# Patient Record
Sex: FEMALE | Race: BLACK OR AFRICAN AMERICAN | Employment: FULL TIME | URBAN - METROPOLITAN AREA
[De-identification: names, ages, dates, MRNs, and addresses within clinical notes are randomized per-mention and may not be internally consistent; named-entity substitution may affect disease eponyms.]

---

## 2017-01-05 ENCOUNTER — HOSPITAL ENCOUNTER (OUTPATIENT)
Dept: PERINATAL CARE | Age: 27
Discharge: HOME | End: 2017-01-05
Attending: OBSTETRICS & GYNECOLOGY
Payer: MEDICAID

## 2017-01-05 PROCEDURE — 76805 OB US >/= 14 WKS SNGL FETUS: CPT | Performed by: OBSTETRICS & GYNECOLOGY

## 2017-01-19 ENCOUNTER — ROUTINE PRENATAL (OUTPATIENT)
Dept: MIDWIFE SERVICES | Age: 27
End: 2017-01-19

## 2017-01-19 VITALS
HEART RATE: 97 BPM | SYSTOLIC BLOOD PRESSURE: 120 MMHG | DIASTOLIC BLOOD PRESSURE: 72 MMHG | WEIGHT: 194 LBS | HEIGHT: 61 IN | BODY MASS INDEX: 36.63 KG/M2 | TEMPERATURE: 98.2 F

## 2017-01-19 DIAGNOSIS — Z34.82 ENCOUNTER FOR SUPERVISION OF OTHER NORMAL PREGNANCY, SECOND TRIMESTER: Primary | ICD-10-CM

## 2017-01-19 DIAGNOSIS — J45.909 UNCOMPLICATED ASTHMA, UNSPECIFIED ASTHMA SEVERITY: ICD-10-CM

## 2017-01-19 DIAGNOSIS — E55.9 VITAMIN D DEFICIENCY: ICD-10-CM

## 2017-01-19 NOTE — PROGRESS NOTES
Chief Complaint   Patient presents with    Routine Prenatal Visit     22w1d     Visit Vitals    /72    Pulse 97    Temp 98.2 °F (36.8 °C) (Oral)    Ht 5' 1\" (1.549 m)    Wt 194 lb (88 kg)    BMI 36.66 kg/m2     1. Have you been to the ER, urgent care clinic since your last visit? Hospitalized since your last visit? No    2. Have you seen or consulted any other health care providers outside of the [address] [place] [address] since your last visit? Include any pap smears or colon screening.  No

## 2017-01-19 NOTE — PROGRESS NOTES
S: Feeling well. No significant complaints or concerns. Consistent, daily fetal mvmt. No ctxs, LOF, or vaginal bldng. Does reports some increased groin pain, worse when walking and standing for long periods at work. Also reports bilateral corporal tunnel, worse in morning and somewhat better by afternoon. O: See prenatal flowsheet for maternal and fetal exam  Blood pressure 120/72, pulse 97, temperature 98.2 °F (36.8 °C), temperature source Oral, height 5' 1\" (1.549 m), weight 194 lb (88 kg). A:  22w1d   Size=Dates    P:   Discussed pelvic pain and recommended belly support band for relief. Recommended wrist braces at night to help with caporal tunnel, discussed normalcy during pregnancy. Reviewed common pregnancy changes and discomfort as well as comfort measures. See prenatal checklist for other topics covered during visit today  RTO in 4 weeks for SUDHAKAR with CNM with GDM screen.

## 2017-02-20 ENCOUNTER — ROUTINE PRENATAL (OUTPATIENT)
Dept: MIDWIFE SERVICES | Age: 27
End: 2017-02-20

## 2017-02-20 VITALS
BODY MASS INDEX: 37.1 KG/M2 | TEMPERATURE: 98.2 F | WEIGHT: 196.5 LBS | HEART RATE: 90 BPM | DIASTOLIC BLOOD PRESSURE: 63 MMHG | HEIGHT: 61 IN | SYSTOLIC BLOOD PRESSURE: 112 MMHG

## 2017-02-20 DIAGNOSIS — Z67.91 RH NEGATIVE STATE IN ANTEPARTUM PERIOD, SECOND TRIMESTER: ICD-10-CM

## 2017-02-20 DIAGNOSIS — Z34.82 ENCOUNTER FOR SUPERVISION OF OTHER NORMAL PREGNANCY, SECOND TRIMESTER: ICD-10-CM

## 2017-02-20 DIAGNOSIS — Z34.82 ENCOUNTER FOR SUPERVISION OF OTHER NORMAL PREGNANCY, SECOND TRIMESTER: Primary | ICD-10-CM

## 2017-02-20 DIAGNOSIS — O26.892 RH NEGATIVE STATE IN ANTEPARTUM PERIOD, SECOND TRIMESTER: ICD-10-CM

## 2017-02-20 DIAGNOSIS — O36.5920 SMALL FOR GESTATIONAL AGE FETUS AFFECTING MOTHER, ANTEPARTUM, SECOND TRIMESTER, NOT APPLICABLE OR UNSPECIFIED FETUS: ICD-10-CM

## 2017-02-20 NOTE — PROGRESS NOTES
Chief Complaint   Patient presents with    Routine Prenatal Visit     26w5d     Visit Vitals    /63    Pulse 90    Temp 98.2 °F (36.8 °C) (Oral)    Ht 5' 1\" (1.549 m)    Wt 196 lb 8 oz (89.1 kg)    BMI 37.13 kg/m2     1. Have you been to the ER, urgent care clinic since your last visit? Hospitalized since your last visit? No    2. Have you seen or consulted any other health care providers outside of the [address] [place] [address] since your last visit? Include any pap smears or colon screening.  No

## 2017-02-20 NOTE — PROGRESS NOTES
S: Feeling well. No significant complaints or concerns except bilateral hip pain. Reports consistent, daily fetal mvmt. No ctxs, LOF, or vaginal bldng. Has not had carbohydrate  Intake this morning. Has moved in in [facility] now, may need to leave practice due to distance. Mom still in area. O: See prenatal flowsheet for maternal and fetal exam  Blood pressure 112/63, pulse 90, temperature 98.2 °F (36.8 °C), temperature source Oral, height 5' 1\" (1.549 m), weight 196 lb 8 oz (89.1 kg), last menstrual period 2016. Anatomy scan was 9 days different from LMP/ early dating scan and f/u ultrasound recommended per .   Drank glucola in office at 1027, sent down to [facility] at 1100 to have blood drawn for 1 hr GCT, ABS and CBC at 1127. A:  26w5d   Size=Dates    P: Nurse visit in 2 weeks for Rhogam and 4 weeks for CNM visit. See prenatal checklist for other topics covered during today's visit  28 week labs and f/u ultrasound ordered per  recommendation.   RTO in 4 weeks for SUDHAKAR with CNM

## 2017-02-20 NOTE — MR AVS SNAPSHOT
Visit Information Date & Time Provider Department Dept. Phone Encounter #  
 2/20/2017 10:00 AM Emely AroraShukri [id number] Upcoming Health Maintenance Date Due INFLUENZA AGE 9 TO ADULT 8/1/2016 PAP AKA CERVICAL CYTOLOGY 9/19/2019 Allergies as of 2/20/2017  Review Complete On: 2/20/2017 By: Emely Arora CNM No Known Allergies Current Immunizations  Reviewed on 11/10/2016 No immunizations on file. Not reviewed this visit You Were Diagnosed With   
  
 Codes Comments Encounter for supervision of other normal pregnancy, second trimester    -  Primary ICD-10-CM: Z34.82 
ICD-9-CM: V22.1 Rh negative state in antepartum period, second trimester     ICD-10-CM: O09.892 ICD-9-CM: V23.89 Vitals BP Pulse Temp Height(growth percentile) Weight(growth percentile) LMP  
 112/63 90 98.2 °F (36.8 °C) (Oral) 5' 1\" (1.549 m) 196 lb 8 oz (89.1 kg) 08/17/2016 (Exact Date) BMI OB Status Smoking Status 37.13 kg/m2 Pregnant Former Smoker BMI and BSA Data Body Mass Index Body Surface Area  
 37.13 kg/m 2 1.96 m 2 Preferred Pharmacy Pharmacy Name Phone [id number], [address] [facility] [phone] Your Updated Medication List  
  
   
This list is accurate as of: 2/20/17 10:44 AM.  Always use your most recent med list.  
  
  
  
  
 Cholecalciferol (Vitamin D3) 2,000 unit Cap capsule Commonly known as:  VITAMIN D3 Take  by mouth two (2) times a day. PNV no.24-iron-folic acid-dha -452 mg-mcg-mg Cmpk Take 1 Tab by mouth daily. We Performed the Following TYPE & SCREEN [ZMM1306 Custom] Comments:  
 ENTER SURGERY DATE IF FOR PRE-OP TESTING. To-Do List   
 02/20/2017 Lab:  CBC WITH AUTOMATED DIFF   
  
 02/20/2017 Lab:  GLUCOSE, GESTATIONAL, 1 HR TOLERANCE Introducing [facility]! Romayne Duster introduces [employer] patient portal. Now you can access parts of your medical record, email your doctor's office, and request medication refills online. 1. In your internet browser, go to [email or web address] 2. Click on the First Time User? Click Here link in the Sign In box. You will see the New Member Sign Up page. 3. Enter your [employer] Access Code exactly as it appears below. You will not need to use this code after youve completed the sign-up process. If you do not sign up before the expiration date, you must request a new code. · [employer] Access Code: [email or web address] Expires: 5/21/2017  9:57 AM 
 
4. Enter the last four digits of your Social Security Number (xxxx) and Date of Birth (mm/dd/yyyy) as indicated and click Submit. You will be taken to the next sign-up page. 5. Create a [employer] ID. This will be your [employer] login ID and cannot be changed, so think of one that is secure and easy to remember. 6. Create a [employer] password. You can change your password at any time. 7. Enter your Password Reset Question and Answer. This can be used at a later time if you forget your password. 8. Enter your e-mail address. You will receive e-mail notification when new information is available in [address] 9. Click Sign Up. You can now view and download portions of your medical record. 10. Click the Download Summary menu link to download a portable copy of your medical information. If you have questions, please visit the Frequently Asked Questions section of the [employer] website. Remember, [employer] is NOT to be used for urgent needs. For medical emergencies, dial 911. Now available from your iPhone and Android! Please provide this summary of care documentation to your next provider. Your primary care clinician is listed as TYRESE CABELLO. If you have any questions after today's visit, please call [phone].

## 2017-02-21 LAB
ABO GROUP BLD: NORMAL
BASOPHILS # BLD AUTO: 0 X10E3/UL (ref 0–0.2)
BASOPHILS NFR BLD AUTO: 0 %
BLD GP AB SCN SERPL QL: NEGATIVE
EOSINOPHIL # BLD AUTO: 0.4 X10E3/UL (ref 0–0.4)
EOSINOPHIL NFR BLD AUTO: 6 %
ERYTHROCYTE [DISTWIDTH] IN BLOOD BY AUTOMATED COUNT: 14.6 % (ref 12.3–15.4)
GLUCOSE 1H P 50 G GLC PO SERPL-MCNC: 143 MG/DL (ref 65–139)
HCT VFR BLD AUTO: 29 % (ref 34–46.6)
HGB BLD-MCNC: 9.4 G/DL (ref 11.1–15.9)
IMM GRANULOCYTES # BLD: 0.1 X10E3/UL (ref 0–0.1)
IMM GRANULOCYTES NFR BLD: 2 %
LYMPHOCYTES # BLD AUTO: 1.2 X10E3/UL (ref 0.7–3.1)
LYMPHOCYTES NFR BLD AUTO: 18 %
MCH RBC QN AUTO: 24.2 PG (ref 26.6–33)
MCHC RBC AUTO-ENTMCNC: 32.4 G/DL (ref 31.5–35.7)
MCV RBC AUTO: 75 FL (ref 79–97)
MONOCYTES # BLD AUTO: 0.4 X10E3/UL (ref 0.1–0.9)
MONOCYTES NFR BLD AUTO: 6 %
NEUTROPHILS # BLD AUTO: 4.8 X10E3/UL (ref 1.4–7)
NEUTROPHILS NFR BLD AUTO: 68 %
PLATELET # BLD AUTO: 293 X10E3/UL (ref 150–379)
RBC # BLD AUTO: 3.88 X10E6/UL (ref 3.77–5.28)
RH BLD: NEGATIVE
WBC # BLD AUTO: 7 X10E3/UL (ref 3.4–10.8)

## 2017-02-22 DIAGNOSIS — O99.012 ANEMIA AFFECTING PREGNANCY IN SECOND TRIMESTER: Primary | ICD-10-CM

## 2017-02-22 DIAGNOSIS — R73.09 ABNORMAL GTT (GLUCOSE TOLERANCE TEST): ICD-10-CM

## 2017-02-22 RX ORDER — ASCORBIC ACID 500 MG
500 TABLET ORAL 2 TIMES DAILY
Qty: 60 TAB | Refills: 3 | Status: SHIPPED | OUTPATIENT
Start: 2017-02-22 | End: 2017-06-10

## 2017-02-22 NOTE — PROGRESS NOTES
Vira Almendarez contacted and informed of anemia and of need to start taking iron and vitamin C BID and of elevated 1 hour and of need to schedule for 3 hour GTT. Need to be NPO after midnight for testing. Number given to call and schedule test. Aware form will be at  to .

## 2017-02-27 ENCOUNTER — TELEPHONE (OUTPATIENT)
Dept: MIDWIFE SERVICES | Age: 27
End: 2017-02-27

## 2017-02-27 NOTE — TELEPHONE ENCOUNTER
Pt calling stating she received a call from Alberto Mendoza stating she needed to get her glucose lab done. She has an appt with the  center this 2017 at 8:00 am and I put her in with Alberto Mendoza at 9:30 am, but she said she needed to her get labs done before she could get her glucose lab done. Please call to discuss b/c patient was confused and I was not sure what she was talking about.

## 2017-03-02 ENCOUNTER — HOSPITAL ENCOUNTER (OUTPATIENT)
Dept: PERINATAL CARE | Age: 27
Discharge: HOME | End: 2017-03-02
Attending: OBSTETRICS & GYNECOLOGY
Payer: MEDICAID

## 2017-03-02 PROCEDURE — 76816 OB US FOLLOW-UP PER FETUS: CPT | Performed by: OBSTETRICS & GYNECOLOGY

## 2017-03-03 ENCOUNTER — TELEPHONE (OUTPATIENT)
Dept: MIDWIFE SERVICES | Age: 27
End: 2017-03-03

## 2017-03-03 DIAGNOSIS — O24.419 GESTATIONAL DIABETES MELLITUS (GDM) IN THIRD TRIMESTER, GESTATIONAL DIABETES METHOD OF CONTROL UNSPECIFIED: Primary | ICD-10-CM

## 2017-03-03 PROBLEM — O24.410 DIET CONTROLLED GESTATIONAL DIABETES MELLITUS (GDM) IN THIRD TRIMESTER: Status: ACTIVE | Noted: 2017-03-03

## 2017-03-03 LAB
GLUCOSE 1H P 100 G GLC PO SERPL-MCNC: 148 MG/DL (ref 65–179)
GLUCOSE 2H P 100 G GLC PO SERPL-MCNC: 177 MG/DL (ref 65–154)
GLUCOSE 3H P 100 G GLC PO SERPL-MCNC: 160 MG/DL (ref 65–139)
GLUCOSE P FAST SERPL-MCNC: 74 MG/DL (ref 65–94)
NOTE:, 102047: ABNORMAL

## 2017-03-03 RX ORDER — INSULIN PUMP SYRINGE, 3 ML
EACH MISCELLANEOUS
Qty: 1 KIT | Refills: 0 | Status: SHIPPED | OUTPATIENT
Start: 2017-03-03 | End: 2018-02-10

## 2017-03-03 RX ORDER — LANCETS
EACH MISCELLANEOUS
Qty: 100 EACH | Refills: 11 | Status: SHIPPED | OUTPATIENT
Start: 2017-03-03 | End: 2018-02-10

## 2017-03-03 NOTE — TELEPHONE ENCOUNTER
Spoke with client on telephone regarding three hour GTT results. Discussed that two of the 4 values were abnormal, therefore a diagnosis of GDM is appropriate. She verbalized understanding. Discussed in detail how to monitor BG and how to record. Verbalized understanding. Referral to diabetes education given.

## 2017-03-03 NOTE — TELEPHONE ENCOUNTER
Andrei Keys, the patient called and wanted to let you know that her insurance does not cover the glucose machine that you wanted her to have. She would like a call back to discuss what she can use in the place of it.

## 2017-03-03 NOTE — TELEPHONE ENCOUNTER
Called pharmacy. They tried 6 different glucometers and none were covered by insurance. They stated client needs to call insurance to see which glucometer will be covered and then they can fill that specific one. Attempted to call client with this information. No answer left VM to call and page on call midwife to facilitate this this weekend so she may starting monitoring blood glucose. On call midwife aware.

## 2017-03-06 ENCOUNTER — TELEPHONE (OUTPATIENT)
Dept: MIDWIFE SERVICES | Age: 27
End: 2017-03-06

## 2017-03-06 ENCOUNTER — TELEPHONE (OUTPATIENT)
Dept: DIABETES SERVICES | Age: 27
End: 2017-03-06

## 2017-03-06 NOTE — TELEPHONE ENCOUNTER
Pt calling stating insurance will cover the nipro diagnostic true metric and the true metric strips. Please call with any questions.

## 2017-03-08 ENCOUNTER — TELEPHONE (OUTPATIENT)
Dept: MIDWIFE SERVICES | Age: 27
End: 2017-03-08

## 2017-03-08 ENCOUNTER — TELEPHONE (OUTPATIENT)
Dept: DIABETES SERVICES | Age: 27
End: 2017-03-08

## 2017-03-08 NOTE — TELEPHONE ENCOUNTER
Spoke with Oliver Amezquita about overdue Rhogam injection and need for injection ASAP. If unable to establish care with provider in [place] this week encouraged Oliver Amezquita to make appt with this office this week for injection. Verbalized understanding.

## 2017-03-09 ENCOUNTER — CLINICAL SUPPORT (OUTPATIENT)
Dept: MIDWIFE SERVICES | Age: 27
End: 2017-03-09

## 2017-03-09 VITALS
WEIGHT: 197 LBS | HEIGHT: 61 IN | HEART RATE: 90 BPM | SYSTOLIC BLOOD PRESSURE: 108 MMHG | BODY MASS INDEX: 37.19 KG/M2 | DIASTOLIC BLOOD PRESSURE: 65 MMHG

## 2017-03-09 DIAGNOSIS — Z29.13 NEED FOR RHOGAM DUE TO RH NEGATIVE MOTHER: ICD-10-CM

## 2017-03-09 DIAGNOSIS — Z34.83 ENCOUNTER FOR SUPERVISION OF OTHER NORMAL PREGNANCY IN THIRD TRIMESTER: ICD-10-CM

## 2017-03-09 NOTE — PROGRESS NOTES
Chief Complaint   Patient presents with    Immunization/Injection     Rhogam     Visit Vitals    /65    Pulse 90    Ht 5' 1\" (1.549 m)    Wt 197 lb (89.4 kg)    LMP 08/17/2016 (Exact Date)    BMI 37.22 kg/m2   1. Have you been to the ER, urgent care clinic since your last visit? Hospitalized since your last visit? No    2. Have you seen or consulted any other health care providers outside of the [address] [place] [address] since your last visit? Include any pap smears or colon screening. No     After obtaining consent, and per orders of Dr Yaakov Leigh, injection of Rhogam given in left gluteus by Ana Guerra LPN. Patient instructed to remain in clinic for 20 minutes afterwards, and to report any adverse reaction to me immediately. Lot: [id number] Exp: [id number] NDC: [id number].

## 2017-03-17 ENCOUNTER — TELEPHONE (OUTPATIENT)
Dept: MIDWIFE SERVICES | Age: 27
End: 2017-03-17

## 2017-03-17 DIAGNOSIS — K59.00 CONSTIPATION, UNSPECIFIED CONSTIPATION TYPE: Primary | ICD-10-CM

## 2017-03-27 ENCOUNTER — HOSPITAL ENCOUNTER (EMERGENCY)
Age: 27
Discharge: HOME | End: 2017-03-27
Attending: INTERNAL MEDICINE
Payer: MEDICAID

## 2017-03-27 ENCOUNTER — APPOINTMENT (OUTPATIENT)
Dept: ULTRASOUND IMAGING | Age: 27
End: 2017-03-27
Attending: INTERNAL MEDICINE
Payer: MEDICAID

## 2017-03-27 VITALS
DIASTOLIC BLOOD PRESSURE: 69 MMHG | HEART RATE: 79 BPM | RESPIRATION RATE: 16 BRPM | OXYGEN SATURATION: 100 % | SYSTOLIC BLOOD PRESSURE: 108 MMHG

## 2017-03-27 DIAGNOSIS — S01.81XA FOREHEAD LACERATION, INITIAL ENCOUNTER: ICD-10-CM

## 2017-03-27 DIAGNOSIS — S30.1XXA ABDOMINAL CONTUSION: ICD-10-CM

## 2017-03-27 DIAGNOSIS — D64.9 ANEMIA, UNSPECIFIED TYPE: ICD-10-CM

## 2017-03-27 DIAGNOSIS — R55 SYNCOPE AND COLLAPSE: Primary | ICD-10-CM

## 2017-03-27 LAB
ALBUMIN SERPL BCP-MCNC: 2.7 G/DL (ref 3.4–5)
ALBUMIN/GLOB SERPL: 0.7 {RATIO} (ref 0.8–1.7)
ALP SERPL-CCNC: 145 U/L (ref 45–117)
ALT SERPL-CCNC: 15 U/L (ref 13–56)
ANION GAP BLD CALC-SCNC: 12 MMOL/L (ref 3–18)
APPEARANCE UR: CLEAR
AST SERPL W P-5'-P-CCNC: 19 U/L (ref 15–37)
BACTERIA URNS QL MICRO: ABNORMAL /HPF
BASOPHILS # BLD AUTO: 0 K/UL (ref 0–0.06)
BASOPHILS # BLD: 0 % (ref 0–2)
BILIRUB SERPL-MCNC: 0.3 MG/DL (ref 0.2–1)
BILIRUB UR QL: NEGATIVE
BUN SERPL-MCNC: 6 MG/DL (ref 7–18)
BUN/CREAT SERPL: 14 (ref 12–20)
CALCIUM SERPL-MCNC: 8.2 MG/DL (ref 8.5–10.1)
CHLORIDE SERPL-SCNC: 106 MMOL/L (ref 100–108)
CK MB CFR SERPL CALC: NORMAL % (ref 0–4)
CK MB SERPL-MCNC: <1 NG/ML (ref 5–25)
CK SERPL-CCNC: 70 U/L (ref 26–192)
CO2 SERPL-SCNC: 23 MMOL/L (ref 21–32)
COLOR UR: YELLOW
CREAT SERPL-MCNC: 0.43 MG/DL (ref 0.6–1.3)
DIFFERENTIAL METHOD BLD: ABNORMAL
EOSINOPHIL # BLD: 0.3 K/UL (ref 0–0.4)
EOSINOPHIL NFR BLD: 4 % (ref 0–5)
EPITH CASTS URNS QL MICRO: ABNORMAL /LPF (ref 0–5)
ERYTHROCYTE [DISTWIDTH] IN BLOOD BY AUTOMATED COUNT: 14.2 % (ref 11.6–14.5)
GLOBULIN SER CALC-MCNC: 4.1 G/DL (ref 2–4)
GLUCOSE BLD STRIP.AUTO-MCNC: 91 MG/DL (ref 70–110)
GLUCOSE SERPL-MCNC: 94 MG/DL (ref 74–99)
GLUCOSE UR STRIP.AUTO-MCNC: NEGATIVE MG/DL
HCT VFR BLD AUTO: 28.8 % (ref 35–45)
HGB BLD-MCNC: 9.1 G/DL (ref 12–16)
HGB UR QL STRIP: ABNORMAL
KETONES UR QL STRIP.AUTO: 40 MG/DL
LEUKOCYTE ESTERASE UR QL STRIP.AUTO: NEGATIVE
LYMPHOCYTES # BLD AUTO: 17 % (ref 21–52)
LYMPHOCYTES # BLD: 1.3 K/UL (ref 0.9–3.6)
MCH RBC QN AUTO: 22.9 PG (ref 24–34)
MCHC RBC AUTO-ENTMCNC: 31.6 G/DL (ref 31–37)
MCV RBC AUTO: 72.5 FL (ref 74–97)
MONOCYTES # BLD: 0.5 K/UL (ref 0.05–1.2)
MONOCYTES NFR BLD AUTO: 7 % (ref 3–10)
NEUTS SEG # BLD: 5.7 K/UL (ref 1.8–8)
NEUTS SEG NFR BLD AUTO: 72 % (ref 40–73)
NITRITE UR QL STRIP.AUTO: NEGATIVE
PH UR STRIP: 6.5 [PH] (ref 5–8)
PLATELET # BLD AUTO: 286 K/UL (ref 135–420)
PMV BLD AUTO: 10.2 FL (ref 9.2–11.8)
POTASSIUM SERPL-SCNC: 3.5 MMOL/L (ref 3.5–5.5)
PROT SERPL-MCNC: 6.8 G/DL (ref 6.4–8.2)
PROT UR STRIP-MCNC: NEGATIVE MG/DL
RBC # BLD AUTO: 3.97 M/UL (ref 4.2–5.3)
RBC #/AREA URNS HPF: ABNORMAL /HPF (ref 0–5)
RBC MORPH BLD: ABNORMAL
SODIUM SERPL-SCNC: 141 MMOL/L (ref 136–145)
SP GR UR REFRACTOMETRY: 1.02 (ref 1–1.03)
TROPONIN I SERPL-MCNC: <0.02 NG/ML (ref 0–0.06)
UROBILINOGEN UR QL STRIP.AUTO: 1 EU/DL (ref 0.2–1)
WBC # BLD AUTO: 7.8 K/UL (ref 4.6–13.2)
WBC URNS QL MICRO: ABNORMAL /HPF (ref 0–5)

## 2017-03-27 PROCEDURE — 76805 OB US >/= 14 WKS SNGL FETUS: CPT

## 2017-03-27 PROCEDURE — 96360 HYDRATION IV INFUSION INIT: CPT

## 2017-03-27 PROCEDURE — 99285 EMERGENCY DEPT VISIT HI MDM: CPT

## 2017-03-27 PROCEDURE — 96361 HYDRATE IV INFUSION ADD-ON: CPT

## 2017-03-27 PROCEDURE — 74011250636 HC RX REV CODE- 250/636: Performed by: INTERNAL MEDICINE

## 2017-03-27 PROCEDURE — 77030018836 HC SOL IRR NACL ICUM -A

## 2017-03-27 PROCEDURE — 85027 COMPLETE CBC AUTOMATED: CPT | Performed by: INTERNAL MEDICINE

## 2017-03-27 PROCEDURE — 81001 URINALYSIS AUTO W/SCOPE: CPT | Performed by: INTERNAL MEDICINE

## 2017-03-27 PROCEDURE — 77030010507 HC ADH SKN DERMBND J&J -B

## 2017-03-27 PROCEDURE — 82553 CREATINE MB FRACTION: CPT | Performed by: INTERNAL MEDICINE

## 2017-03-27 PROCEDURE — 82550 ASSAY OF CK (CPK): CPT | Performed by: INTERNAL MEDICINE

## 2017-03-27 PROCEDURE — 80053 COMPREHEN METABOLIC PANEL: CPT | Performed by: INTERNAL MEDICINE

## 2017-03-27 PROCEDURE — 84484 ASSAY OF TROPONIN QUANT: CPT | Performed by: INTERNAL MEDICINE

## 2017-03-27 PROCEDURE — 93005 ELECTROCARDIOGRAM TRACING: CPT

## 2017-03-27 PROCEDURE — 75810000293 HC SIMP/SUPERF WND  RPR

## 2017-03-27 PROCEDURE — 82962 GLUCOSE BLOOD TEST: CPT

## 2017-03-27 PROCEDURE — 94762 N-INVAS EAR/PLS OXIMTRY CONT: CPT

## 2017-03-27 RX ORDER — LANOLIN ALCOHOL/MO/W.PET/CERES
325 CREAM (GRAM) TOPICAL
Qty: 30 TAB | Refills: 0 | Status: SHIPPED | OUTPATIENT
Start: 2017-03-27 | End: 2017-04-26

## 2017-03-27 RX ORDER — LANOLIN ALCOHOL/MO/W.PET/CERES
325 CREAM (GRAM) TOPICAL
Qty: 30 TAB | Refills: 0 | Status: SHIPPED | OUTPATIENT
Start: 2017-03-27 | End: 2017-03-27

## 2017-03-27 RX ADMIN — SODIUM CHLORIDE 1000 ML: 900 INJECTION, SOLUTION INTRAVENOUS at 18:00

## 2017-03-27 NOTE — ED NOTES
Pt brought in by [facility] ems for dizziness and near syncopal episode and lac to head from pt falling forward into a sink. Pt reports feeling better not after ems iv fluids, ems reports low bp in 70\"s on there arrival but improved after fluid. Pt is 31 week pregnant and feels baby moving with no abd pain or vaginal bleeding.

## 2017-03-27 NOTE — DISCHARGE INSTRUCTIONS
Contusion: Care Instructions  Your Care Instructions  Contusion is the medical term for a bruise. It is the result of a direct blow or an impact, such as a fall. Contusions are common sports injuries. Most people think of a bruise as a black-and-blue spot. This happens when small blood vessels get torn and leak blood under the skin. But bones, muscles, and organs can also get bruised. This may damage deep tissues but not cause a bruise you can see. The doctor will do a physical exam to find the location of your contusion. You may also have tests to make sure you do not have a more serious injury, such as a broken bone or nerve damage. These may include X-rays or other imaging tests like a CT scan or MRI. Deep-tissue contusions may cause pain and swelling. But if there is no serious damage, they will often get better in a few weeks with home treatment. The doctor has checked you carefully, but problems can develop later. If you notice any problems or new symptoms, get medical treatment right away. Follow-up care is a key part of your treatment and safety. Be sure to make and go to all appointments, and call your doctor if you are having problems. It's also a good idea to know your test results and keep a list of the medicines you take. How can you care for yourself at home? · Put ice or a cold pack on the sore area for 10 to 20 minutes at a time to stop swelling. Put a thin cloth between the ice pack and your skin. · Be safe with medicines. Read and follow all instructions on the label. ¨ If the doctor gave you a prescription medicine for pain, take it as prescribed. ¨ If you are not taking a prescription pain medicine, ask your doctor if you can take an over-the-counter medicine. · If you can, prop up the sore area on pillows as much as possible for the next few days. Try to keep the sore area above the level of your heart. When should you call for help?   Call your doctor now or seek immediate medical care if:  · Your pain gets worse. · You have new or worse swelling. · You have tingling, weakness, or numbness in the area near the contusion. · The area near the contusion is cold or pale. Watch closely for changes in your health, and be sure to contact your doctor if:  · You do not get better as expected. Where can you learn more? Go to http://macy-marianne.info/. Enter I263 in the search box to learn more about \"Contusion: Care Instructions. \"  Current as of: May 27, 2016  Content Version: 11.1  © 8283-7854 [employer]. Care instructions adapted under license by [employer] (which disclaims liability or warranty for this information). If you have questions about a medical condition or this instruction, always ask your healthcare professional. [address] any warranty or liability for your use of this information. Cuts Closed With Adhesives: Care Instructions  Your Care Instructions  A cut can happen anywhere on your body. The doctor used an adhesive to close the cut. When the adhesive dries, it forms a film that holds the edges of the cut together. Skin adhesives are sometimes called liquid stitches. If the cut went deep and through the skin, the doctor may have put in a layer of stitches below the adhesive. The deeper layer of stitches brings the deep part of the cut together. These stitches will dissolve and don't need to be removed. You don't see the stitches, only the adhesive. You may have a bandage. The doctor has checked you carefully, but problems can develop later. If you notice any problems or new symptoms, get medical treatment right away. Follow-up care is a key part of your treatment and safety. Be sure to make and go to all appointments, and call your doctor if you are having problems. It's also a good idea to know your test results and keep a list of the medicines you take.   How can you care for yourself at home?  · Keep the cut dry for the first 24 to 48 hours. After this, you can shower if your doctor okays it. Pat the cut dry. · Don't soak the cut, such as in a bathtub. Your doctor will tell you when it's safe to get the cut wet. · If your doctor told you how to care for your cut, follow your doctor's instructions. If you did not get instructions, follow this general advice:  ¨ Do not put any kind of ointment, cream, or lotion over the area. This can make the adhesive fall off too soon. ¨ After the first 24 to 48 hours, wash around the cut with clean water 2 times a day. Do not use hydrogen peroxide or alcohol, which can slow healing. ¨ If the doctor told you to use a bandage, put on a new bandage after cleaning the cut or if the bandage gets wet or dirty. · Prop up the sore area on a pillow anytime you sit or lie down during the next 3 days. Try to keep it above the level of your heart. This will help reduce swelling. · Leave the skin adhesive on your skin until it falls off on its own. This may take 5 to 10 days. · Do not scratch, rub, or pick at the adhesive. · Do not put the sticky part of a bandage directly on the adhesive. · Avoid any activity that could cause your cut to reopen. · Be safe with medicines. Read and follow all instructions on the label. ¨ If the doctor gave you a prescription medicine for pain, take it as prescribed. ¨ If you are not taking a prescription pain medicine, ask your doctor if you can take an over-the-counter medicine. When should you call for help? Call your doctor now or seek immediate medical care if:  · You have new pain, or your pain gets worse. · The skin near the cut is cold or pale or changes color. · You have tingling, weakness, or numbness near the cut. · The cut starts to bleed. · You have trouble moving the area near the cut. · You have symptoms of infection, such as:  ¨ Increased pain, swelling, warmth, or redness around the cut.   ¨ Red streaks leading from the cut. ¨ Pus draining from the cut. ¨ A fever. Watch closely for changes in your health, and be sure to contact your doctor if:  · The cut reopens. · You do not get better as expected. Where can you learn more? Go to http://macy-marianne.info/. Enter P174 in the search box to learn more about \"Cuts Closed With Adhesives: Care Instructions. \"  Current as of: May 27, 2016  Content Version: 11.1  © 3295-0768 [employer]. Care instructions adapted under license by [employer] (which disclaims liability or warranty for this information). If you have questions about a medical condition or this instruction, always ask your healthcare professional. [address] any warranty or liability for your use of this information. Anemia: Care Instructions  Your Care Instructions    Anemia is a low level of red blood cells, which carry oxygen throughout your body. Many things can cause anemia. Lack of iron is one of the most common causes. Your body needs iron to make hemoglobin, a substance in red blood cells that carries oxygen from the lungs to your body's cells. Without enough iron, the body produces fewer and smaller red blood cells. As a result, your body's cells do not get enough oxygen, and you feel tired and weak. And you may have trouble concentrating. Bleeding is the most common cause of a lack of iron. You may have heavy menstrual bleeding or bleeding caused by conditions such as ulcers, hemorrhoids, or cancer. Regular use of aspirin or other anti-inflammatory medicines (such as ibuprofen) also can cause bleeding in some people. A lack of iron in your diet also can cause anemia, especially at times when the body needs more iron, such as during pregnancy, infancy, and the teen years. Your doctor may have prescribed iron pills.  It may take several months of treatment for your iron levels to return to normal. Your doctor also may suggest that you eat foods that are rich in iron, such as meat and beans. There are many other causes of anemia. It is not always due to a lack of iron. Finding the specific cause of your anemia will help your doctor find the right treatment for you. Follow-up care is a key part of your treatment and safety. Be sure to make and go to all appointments, and call your doctor if you are having problems. It's also a good idea to know your test results and keep a list of the medicines you take. How can you care for yourself at home? · Take your medicines exactly as prescribed. Call your doctor if you think you are having a problem with your medicine. · If your doctor recommends iron pills, take them as directed:  ¨ Try to take the pills on an empty stomach about 1 hour before or 2 hours after meals. But you may need to take iron with food to avoid an upset stomach. ¨ Do not take antacids or drink milk or caffeine drinks (such as coffee, tea, or cola) at the same time or within 2 hours of the time that you take your iron. They can make it hard for your body to absorb the iron. ¨ Vitamin C (from food or supplements) helps your body absorb iron. Try taking iron pills with a glass of orange juice or some other food that is high in vitamin C, such as citrus fruits. ¨ Iron pills may cause stomach problems, such as heartburn, nausea, diarrhea, constipation, and cramps. Be sure to drink plenty of fluids, and include fruits, vegetables, and fiber in your diet each day. Iron pills often make your bowel movements dark or green. ¨ If you forget to take an iron pill, do not take a double dose of iron the next time you take a pill. ¨ Keep iron pills out of the reach of small children. An overdose of iron can be very dangerous. · Follow your doctor's advice about eating iron-rich foods. These include red meat, shellfish, poultry, eggs, beans, raisins, whole-grain bread, and leafy green vegetables.   · Steam vegetables to help them keep their iron content. When should you call for help? Call 911 anytime you think you may need emergency care. For example, call if:  · You have symptoms of a heart attack. These may include:  ¨ Chest pain or pressure, or a strange feeling in the chest.  ¨ Sweating. ¨ Shortness of breath. ¨ Nausea or vomiting. ¨ Pain, pressure, or a strange feeling in the back, neck, jaw, or upper belly or in one or both shoulders or arms. ¨ Lightheadedness or sudden weakness. ¨ A fast or irregular heartbeat. After you call 911, the  may tell you to chew 1 adult-strength or 2 to 4 low-dose aspirin. Wait for an ambulance. Do not try to drive yourself. · You passed out (lost consciousness). Call your doctor now or seek immediate medical care if:  · You have new or increased shortness of breath. · You are dizzy or lightheaded, or you feel like you may faint. · Your fatigue and weakness continue or get worse. · You have any abnormal bleeding, such as:  ¨ Nosebleeds. ¨ Vaginal bleeding that is different (heavier, more frequent, at a different time of the month) than what you are used to. ¨ Bloody or black stools, or rectal bleeding. ¨ Bloody or pink urine. Watch closely for changes in your health, and be sure to contact your doctor if:  · You do not get better as expected. Where can you learn more? Go to http://macy-marianne.info/. Enter R301 in the search box to learn more about \"Anemia: Care Instructions. \"  Current as of: February 5, 2016  Content Version: 11.1  © 1279-9885 [employer]. Care instructions adapted under license by [employer] (which disclaims liability or warranty for this information). If you have questions about a medical condition or this instruction, always ask your healthcare professional. [address] any warranty or liability for your use of this information.          Fainting: Care Instructions  Your Care Instructions    When you faint, or pass out, you lose consciousness for a short time. A brief drop in blood flow to the brain often causes it. When you fall or lie down, more blood flows to your brain and you regain consciousness. Emotional stress, pain, or overheating--especially if you have been standing--can make you faint. In these cases, fainting is usually not serious. But fainting can be a sign of a more serious problem. Your doctor may want you to have more tests to rule out other causes. The treatment you need depends on the reason why you fainted. The doctor has checked you carefully, but problems can develop later. If you notice any problems or new symptoms, get medical treatment right away. Follow-up care is a key part of your treatment and safety. Be sure to make and go to all appointments, and call your doctor if you are having problems. It's also a good idea to know your test results and keep a list of the medicines you take. How can you care for yourself at home? · Drink plenty of fluids to prevent dehydration. If you have kidney, heart, or liver disease and have to limit fluids, talk with your doctor before you increase your fluid intake. When should you call for help? Call 911 anytime you think you may need emergency care. For example, call if:  · You have symptoms of a heart problem. These may include:  ¨ Chest pain or pressure. ¨ Severe trouble breathing. ¨ A fast or irregular heartbeat. ¨ Lightheadedness or sudden weakness. ¨ Coughing up pink, foamy mucus. ¨ Passing out. After you call 911, the  may tell you to chew 1 adult-strength or 2 to 4 low-dose aspirin. Wait for an ambulance. Do not try to drive yourself. · You have symptoms of a stroke. These may include:  ¨ Sudden numbness, tingling, weakness, or loss of movement in your face, arm, or leg, especially on only one side of your body. ¨ Sudden vision changes. ¨ Sudden trouble speaking.   ¨ Sudden confusion or trouble understanding simple statements. ¨ Sudden problems with walking or balance. ¨ A sudden, severe headache that is different from past headaches. · You passed out (lost consciousness) again. Watch closely for changes in your health, and be sure to contact your doctor if:  · You do not get better as expected. Where can you learn more? Go to http://macy-marianne.info/. Enter S374 in the search box to learn more about \"Fainting: Care Instructions. \"  Current as of: May 27, 2016  Content Version: 11.1  © 7859-8088 [employer]. Care instructions adapted under license by [employer] (which disclaims liability or warranty for this information). If you have questions about a medical condition or this instruction, always ask your healthcare professional. [address] any warranty or liability for your use of this information.

## 2017-03-27 NOTE — ED PROVIDER NOTES
[address]  EMERGENCY DEPARTMENT HISTORY AND PHYSICAL EXAM       Date: 3/27/2017   Patient Name: Paula Foote   YOB: 1990  Medical Record Number: [id number]    History of Presenting Illness     Chief Complaint   Patient presents with    Dizziness    Laceration    Hypotension        History Provided By:  Patient and fiance    Additional History:   5:12 PM   Paula Foote is a 27 y.o. female ([id number]) with a hx of gestational DM and asthma who is 30  presents to the emergency department via EMS c/o near syncopal episode today. Associated symptoms include dizziness, mild headache, and laceration to right forehead. Pt reports she was going to the bathroom, went over to the sink to splash her face because she was hot and dizzy, and is unsure if she completely passed out. Pt's fiance reports that pt was standing and fell face first onto sink and fell onto her stomach. Pt reports she has been dizzy like this in the past while pregnancy    Pt reports she was told by EMS her blood glucose was 88 en route to the ED. Last US was 1 month ago. Tetanus UTD. Pt denies chest pain, SOB, palpitations, fever/chills, cough, congestion, sore throat, rhinorrhea, seizure, urinary/bowel incontinence, vaginal bleeding, vaginal discharge, abdominal pain/cramping, numbness/tingling/weakness, any swelling, lumps/bumps, any rashes, any abdominal surgeries, tobacco use, recreational drug use, EtOH use, and any other symptoms or complaints. Primary Care Provider: Beverly Mata MD   Specialist:    Past History     Past Medical History:   Past Medical History:   Diagnosis Date    Abnormal Papanicolaou smear of cervix          Asthma     Ectopic pregnancy 2008    Pregnancy 2006    Rh negative state in antepartum period         Past Surgical History:   History reviewed. No pertinent surgical history.      Family History:   Family History   Problem Relation Age of Onset    High Cholesterol Mother     Hypertension Mother     Other Father     Asthma Father     Diabetes Maternal Grandfather     Cancer Maternal Grandmother     Cancer Paternal Grandmother         Social History:   Social History   Substance Use Topics    Smoking status: Former Smoker     Packs/day: 0.25     Years: 4.00     Quit date: 3/18/2015    Smokeless tobacco: Former User     Quit date: 7/18/2015    Alcohol use No      Comment: Stopped when found out pregnant        Allergies:   No Known Allergies     Review of Systems   Review of Systems   Constitutional: Negative for chills and fever. HENT: Negative for congestion, rhinorrhea and sore throat. Respiratory: Negative for cough and shortness of breath. Cardiovascular: Negative for chest pain and palpitations. Gastrointestinal: Negative for abdominal pain. Genitourinary: Negative for vaginal bleeding and vaginal discharge. Skin: Positive for wound (laceration to right forehead). Negative for rash. Neurological: Positive for dizziness, syncope (near) and headaches. Negative for seizures, weakness and numbness. Negative Urinary/bowel incontinence. All other systems reviewed and are negative. Physical Exam  Vitals:    03/27/17 1830 03/27/17 1900 03/27/17 1930 03/27/17 2000   BP: 110/60 116/61 109/62 108/69   Pulse: 89 81 80 79   Resp: 24 23 22 16   SpO2: 100% 100% 100% 100%       Physical Exam   Constitutional: She is oriented to person, place, and time. She appears well-developed and well-nourished. HENT:   Head: Normocephalic and atraumatic. Right Ear: External ear normal.   Left Ear: External ear normal.   Nose: Nose normal.   Mouth/Throat: Oropharynx is clear and moist.   Eyes: Conjunctivae and EOM are normal. Pupils are equal, round, and reactive to light. Right eye exhibits no discharge. Left eye exhibits no discharge. No scleral icterus. Neck: Normal range of motion. Neck supple. No JVD present.  No tracheal deviation present. Cardiovascular: Normal rate, regular rhythm, normal heart sounds and intact distal pulses. Pulmonary/Chest: Effort normal and breath sounds normal.   Abdominal: Soft. Bowel sounds are normal. She exhibits no distension. There is no tenderness. No HSM. Protuberance uterine lining above umbilicius    Musculoskeletal: Normal range of motion. Neurological: She is alert and oriented to person, place, and time. She has normal reflexes. She displays no atrophy and no tremor. No cranial nerve deficit or sensory deficit. She exhibits normal muscle tone. She displays no seizure activity. Coordination and gait normal.   No focal motor weakness. Skin: Skin is warm and dry. Laceration noted. No rash noted. Mild swelling to forehead. No acute bleeding. Jagged laceration 1.8 cm middle to right side of forehead. No foreign body. Psychiatric: She has a normal mood and affect. Her behavior is normal.   Nursing note and vitals reviewed.         Diagnostic Study Results     Labs -      Recent Results (from the past 12 hour(s))   GLUCOSE, POC    Collection Time: 03/27/17  6:01 PM   Result Value Ref Range    Glucose (POC) 91 70 - 110 mg/dL   URINALYSIS W/ RFLX MICROSCOPIC    Collection Time: 03/27/17  6:10 PM   Result Value Ref Range    Color YELLOW      Appearance CLEAR      Specific gravity 1.016 1.005 - 1.030      pH (UA) 6.5 5.0 - 8.0      Protein NEGATIVE  NEG mg/dL    Glucose NEGATIVE  NEG mg/dL    Ketone 40 (A) NEG mg/dL    Bilirubin NEGATIVE  NEG      Blood MODERATE (A) NEG      Urobilinogen 1.0 0.2 - 1.0 EU/dL    Nitrites NEGATIVE  NEG      Leukocyte Esterase NEGATIVE  NEG     URINE MICROSCOPIC ONLY    Collection Time: 03/27/17  6:10 PM   Result Value Ref Range    WBC 4 to 10 0 - 5 /hpf    RBC 41 to 50 0 - 5 /hpf    Epithelial cells FEW 0 - 5 /lpf    Bacteria 2+ (A) NEG /hpf   CBC W/O DIFF    Collection Time: 03/27/17  6:12 PM   Result Value Ref Range    WBC 7.8 4.6 - 13.2 K/uL    RBC 3.97 (L) 4.20 - 5.30 M/uL    HGB 9.1 (L) 12.0 - 16.0 g/dL    HCT 28.8 (L) 35.0 - 45.0 %    MCV 72.5 (L) 74.0 - 97.0 FL    MCH 22.9 (L) 24.0 - 34.0 PG    MCHC 31.6 31.0 - 37.0 g/dL    RDW 14.2 11.6 - 14.5 %    PLATELET 508 041 - 039 K/uL    MPV 10.2 9.2 - 11.8 FL   CK-MB,QT    Collection Time: 03/27/17  6:12 PM   Result Value Ref Range    CK - MB <1.0 <3.6 ng/ml    CK-MB Index Cannot be calulated 0.0 - 4.0 %   CK    Collection Time: 03/27/17  6:12 PM   Result Value Ref Range    CK 70 26 - 192 U/L   DIFFERENTIAL, AUTO    Collection Time: 03/27/17  6:12 PM   Result Value Ref Range    NEUTROPHILS 72 40 - 73 %    LYMPHOCYTES 17 (L) 21 - 52 %    MONOCYTES 7 3 - 10 %    EOSINOPHILS 4 0 - 5 %    BASOPHILS 0 0 - 2 %    ABS. NEUTROPHILS 5.7 1.8 - 8.0 K/UL    ABS. LYMPHOCYTES 1.3 0.9 - 3.6 K/UL    ABS. MONOCYTES 0.5 0.05 - 1.2 K/UL    ABS. EOSINOPHILS 0.3 0.0 - 0.4 K/UL    ABS. BASOPHILS 0.0 0.0 - 0.06 K/UL    RBC COMMENTS HYPOCHROMIA  SLIGHT  POIKILOCYTOSIS  1+        RBC COMMENTS OVALOCYTES  1+        RBC COMMENTS SPHEROCYTES  1+        DF AUTOMATED     METABOLIC PANEL, COMPREHENSIVE    Collection Time: 03/27/17  6:12 PM   Result Value Ref Range    Sodium 141 136 - 145 mmol/L    Potassium 3.5 3.5 - 5.5 mmol/L    Chloride 106 100 - 108 mmol/L    CO2 23 21 - 32 mmol/L    Anion gap 12 3.0 - 18 mmol/L    Glucose 94 74 - 99 mg/dL    BUN 6 (L) 7.0 - 18 MG/DL    Creatinine 0.43 (L) 0.6 - 1.3 MG/DL    BUN/Creatinine ratio 14 12 - 20      GFR est AA >60 >60 ml/min/1.73m2    GFR est non-AA >60 >60 ml/min/1.73m2    Calcium 8.2 (L) 8.5 - 10.1 MG/DL    Bilirubin, total 0.3 0.2 - 1.0 MG/DL    ALT (SGPT) 15 13 - 56 U/L    AST (SGOT) 19 15 - 37 U/L    Alk.  phosphatase 145 (H) 45 - 117 U/L    Protein, total 6.8 6.4 - 8.2 g/dL    Albumin 2.7 (L) 3.4 - 5.0 g/dL    Globulin 4.1 (H) 2.0 - 4.0 g/dL    A-G Ratio 0.7 (L) 0.8 - 1.7     TROPONIN I    Collection Time: 03/27/17  6:12 PM   Result Value Ref Range    Troponin-I, Qt. <0.02 0.00 - 0.06 NG/ML Radiologic Studies -  US PREG UTS > 14 WKS SNGL   Final Result  IMPRESSION:     There is a single live intrauterine gestation mean gestational age is 34 weeks 4  days. There is no evidence of placental abruption. Amniotic fluid volume is  normal. Cervix is difficult to evaluate.     As read by the radiologist.            Medical Decision Making   I am the first provider for this patient. I reviewed the vital signs, available nursing notes, past medical history, past surgical history, family history and social history. Vital Signs-Reviewed the patient's vital signs. Patient Vitals for the past 12 hrs:   Pulse Resp BP SpO2   03/27/17 2000 79 16 108/69 100 %   03/27/17 1930 80 22 109/62 100 %   03/27/17 1900 81 23 116/61 100 %   03/27/17 1830 89 24 110/60 100 %   03/27/17 1800 97 20 111/63 100 %   03/27/17 1730 83 18 114/65 100 %   03/27/17 1715 84 14 110/62 100 %   03/27/17 1700 84 22 107/62 100 %       Pulse Oximetry Analysis - Normal 100% on RA     Cardiac Monitor:   Rate: 78 bpm  Rhythm: Normal Sinus Rhythm     EKG interpretation: (Preliminary)  17:57  NSR, 81 bpm, negative STEMI  EKG read by Gabriela Palumbo MD     Old Medical Records: Nursing notes. Provider Notes:   Syncope:  Anemia, arrhythmia, ACS, vasovagal, infection, pregnancy changes, doubt acute intracranial process, hypoglycemic episode  Contusion to stomach r/o placental abruption, other OB/GYN complications    Procedures:   Wound Closure by Adhesive  Date/Time: 3/27/2017 7:28 PM  Performed by: Kaelyn Ford by: Teresita Matthews     Consent:     Consent obtained:  Verbal    Consent given by:  Patient  Laceration details:     Location:  Face    Face location:  Forehead    Length (cm):  1.8  Pre-procedure details:     Preparation:  Patient was prepped and draped in usual sterile fashion  Treatment:     Area cleansed with:  Saline    Visualized foreign bodies/material removed: no    Skin repair:     Repair method:  Tissue adhesive (Dermabone)  Post-procedure details:     Dressing:  Open (no dressing)    Patient tolerance of procedure: Tolerated well, no immediate complications  Comments:      No active bleeding. Medications Given in the ED:  Medications   sodium chloride 0.9 % bolus infusion 1,000 mL (0 mL IntraVENous IV Completed 3/27/17 2000)        PROGRESS NOTE:  5:12 PM  Initial assessment performed. CONSULT NOTE:   7:50 PM   Marin Kapoor MD  spoke with Dr. Rebecca Christensen via telephone consult  Specialty: OB/GYN  Discussed pt's hx, disposition, available diagnostic, and imaging results. Reviewed care plans. Consulting physician agrees with plans as outlined. He states as long as pt is not having contractions or pain she can follow up with OB/GYN outpatient. PROGRESS NOTE:   8:11 PM  Pt has been re-examined by Marin Kapoor MD. Pt ambulated without difficulty w/o any sxs. No arrythmia on monitor while in ER. Discharge Note:  8:13 PM   Pt has been reexamined. Patient has no new complaints, changes, or physical findings. Care plan outlined and precautions discussed. Results were reviewed with the patient. All medications were reviewed with the patient; will d/c home with iron. All of pt's questions and concerns were addressed. Patient was instructed and agrees to follow up with OB/GYN, as well as to return to the ED upon further deterioration. Patient is ready to go home. Diagnosis   Clinical Impression:   1. Syncope and collapse    2. Forehead laceration, initial encounter    3. Abdominal contusion    4. Anemia, unspecified type         Follow-up Information     Follow up With Details Comments Brooks Mondragon MD Call in 2 days Follow up with your OB/GYN or the one listed.   [address] [address]  [phone]      [facility] EMERGENCY DEPT  As needed, If symptoms worsen [address] [place] [id number] [phone]          Discharge Medication List as of 3/27/2017  8:09 PM      CONTINUE these medications which have NOT CHANGED    Details   docusate sodium (COLACE) 50 mg capsule Take 1 Cap by mouth two (2) times a day for 90 days. , Normal, Disp-60 Cap, R-2      Blood-Glucose Meter monitoring kit Please use to check blood glucose 4 times a day. Fastin gin AM and two hours after each meal., Normal, Disp-1 Kit, R-0      Lancets misc Please use to check blood glucose 4 times a day. Fastin gin AM and two hours after each meal., Normal, Disp-100 Each, R-11      glucose blood VI test strips (BLOOD GLUCOSE TEST) strip Please use to check blood glucose 4 times a day. Fastin gin AM and two hours after each meal., Normal, Disp-100 Strip, R-11      ascorbic acid, vitamin C, (VITAMIN C) 500 mg tablet Take 1 Tab by mouth two (2) times a day., Normal, Disp-60 Tab, R-3      Ferrous Sulfate (SLOW FE) 47.5 mg iron TbER tablet Take 1 Tab by mouth two (2) times a day. Indications: IRON DEFICIENCY ANEMIA, Print, Disp-60 Tab, R-3      PNV no.24-iron-folic acid-dha -862 mg-mcg-mg cmpk Take 1 Tab by mouth daily. , Normal, Disp-90 Tab, R-4      Cholecalciferol, Vitamin D3, (VITAMIN D3) 2,000 unit cap capsule Take  by mouth two (2) times a day., Historical Med             _______________________________   Attestations: This note is prepared by Danny Quezada, acting as a Scribe for Gabriela Palumbo MD  at 5:11 PM on 3/27/2017 . Gabriela Palumbo MD: The scribe's documentation has been prepared under my direction and personally reviewed by me in its entirety.   _______________________________

## 2017-03-29 ENCOUNTER — ROUTINE PRENATAL (OUTPATIENT)
Dept: MIDWIFE SERVICES | Age: 27
End: 2017-03-29

## 2017-03-29 VITALS
TEMPERATURE: 98.5 F | BODY MASS INDEX: 37 KG/M2 | DIASTOLIC BLOOD PRESSURE: 68 MMHG | HEIGHT: 61 IN | WEIGHT: 196 LBS | HEART RATE: 92 BPM | SYSTOLIC BLOOD PRESSURE: 109 MMHG

## 2017-03-29 DIAGNOSIS — O24.410 DIET CONTROLLED GESTATIONAL DIABETES MELLITUS (GDM) IN THIRD TRIMESTER: Primary | ICD-10-CM

## 2017-03-29 DIAGNOSIS — O99.013 ANEMIA AFFECTING PREGNANCY IN THIRD TRIMESTER: ICD-10-CM

## 2017-03-29 NOTE — PROGRESS NOTES
Chief Complaint   Patient presents with    Routine Prenatal Visit     32w0d     Visit Vitals    /68    Pulse 92    Temp 98.5 °F (36.9 °C) (Oral)    Ht 5' 1\" (1.549 m)    Wt 196 lb (88.9 kg)    BMI 37.03 kg/m2     1. Have you been to the ER, urgent care clinic since your last visit? Hospitalized since your last visit? Patient went to ER, 3/27/17, patient fainted after feeling dizzy. Ultrasound was done. 2. Have you seen or consulted any other health care providers outside of the [address] [place] [address] since your last visit? Include any pap smears or colon screening. No     Pt having frequent fetal movement and has not felt as dizzy since.

## 2017-03-29 NOTE — PROGRESS NOTES
S: Feeling well. Seen in ER after falling and \"passing out\"  while in Peds office with son, hit head and required sutures. She reports she has not started taking iron supplements yet as recommended at last visit. \"I got them today and am going to take them when I leave here\". Reports consistent, daily fetal mvmt. No ctxs, LOF, or vaginal bldng. O: See prenatal flowsheet for maternal and fetal exam  Blood pressure 109/68, pulse 92, temperature 98.5 °F (36.9 °C), temperature source Oral, height 5' 1\" (1.549 m), weight 196 lb (88.9 kg), last menstrual period 08/17/2016. Reviewed FSBS from last few days FBS <80s, 2 hr PP majority <120. Has moved to [facility] and finding new provider in area. Got records from us today. Reviewed importance of taking iron with vitamin C or taking Floradix. Encouraged to take stool softener to avoid constipation. Ede Ruiz A:G5   32w0d   Size=Dates      P: Start iron supplement, iron rich foods, continue FSBS  For GDM.   See prenatal checklist for other topics covered during today's visit  RTO in 2 weeks for SUDHAKAR with CNM  Record for new provider

## 2017-04-02 LAB
ATRIAL RATE: 81 BPM
CALCULATED P AXIS, ECG09: -6 DEGREES
CALCULATED R AXIS, ECG10: 8 DEGREES
CALCULATED T AXIS, ECG11: 2 DEGREES
DIAGNOSIS, 93000: NORMAL
P-R INTERVAL, ECG05: 172 MS
Q-T INTERVAL, ECG07: 392 MS
QRS DURATION, ECG06: 84 MS
QTC CALCULATION (BEZET), ECG08: 455 MS
VENTRICULAR RATE, ECG03: 81 BPM

## 2017-04-30 NOTE — TELEPHONE ENCOUNTER
Pt would like to a stool softener Rx sent to [facility] on file. Please call.
Rx sent to pharmacy. Attempted to call client, no answer and VM full.
Spoke with client on telephone, aware Rx was sent to pharmacy.
no

## 2017-06-10 ENCOUNTER — HOSPITAL ENCOUNTER (EMERGENCY)
Age: 27
Discharge: HOME | End: 2017-06-10
Attending: EMERGENCY MEDICINE
Payer: MEDICAID

## 2017-06-10 VITALS
HEIGHT: 61 IN | OXYGEN SATURATION: 100 % | HEART RATE: 60 BPM | DIASTOLIC BLOOD PRESSURE: 80 MMHG | BODY MASS INDEX: 32.66 KG/M2 | RESPIRATION RATE: 16 BRPM | WEIGHT: 173 LBS | SYSTOLIC BLOOD PRESSURE: 110 MMHG | TEMPERATURE: 97.8 F

## 2017-06-10 DIAGNOSIS — K08.89 PAIN, DENTAL: ICD-10-CM

## 2017-06-10 DIAGNOSIS — R07.89 ATYPICAL CHEST PAIN: Primary | ICD-10-CM

## 2017-06-10 DIAGNOSIS — Z87.09 H/O INTRINSIC ASTHMA: ICD-10-CM

## 2017-06-10 LAB
ANION GAP BLD CALC-SCNC: 12 MMOL/L (ref 3–18)
BASOPHILS # BLD AUTO: 0 K/UL (ref 0–0.06)
BASOPHILS # BLD: 0 % (ref 0–2)
BUN SERPL-MCNC: 14 MG/DL (ref 7–18)
BUN/CREAT SERPL: 25 (ref 12–20)
CALCIUM SERPL-MCNC: 8.9 MG/DL (ref 8.5–10.1)
CHLORIDE SERPL-SCNC: 105 MMOL/L (ref 100–108)
CK MB CFR SERPL CALC: NORMAL % (ref 0–4)
CK MB SERPL-MCNC: <1 NG/ML (ref 5–25)
CK SERPL-CCNC: 116 U/L (ref 26–192)
CO2 SERPL-SCNC: 24 MMOL/L (ref 21–32)
CREAT SERPL-MCNC: 0.55 MG/DL (ref 0.6–1.3)
DIFFERENTIAL METHOD BLD: ABNORMAL
EOSINOPHIL # BLD: 0.4 K/UL (ref 0–0.4)
EOSINOPHIL NFR BLD: 7 % (ref 0–5)
ERYTHROCYTE [DISTWIDTH] IN BLOOD BY AUTOMATED COUNT: 17.6 % (ref 11.6–14.5)
GLUCOSE SERPL-MCNC: 75 MG/DL (ref 74–99)
HCT VFR BLD AUTO: 35.6 % (ref 35–45)
HGB BLD-MCNC: 11.1 G/DL (ref 12–16)
LYMPHOCYTES # BLD AUTO: 40 % (ref 21–52)
LYMPHOCYTES # BLD: 2.1 K/UL (ref 0.9–3.6)
MCH RBC QN AUTO: 21.7 PG (ref 24–34)
MCHC RBC AUTO-ENTMCNC: 31.2 G/DL (ref 31–37)
MCV RBC AUTO: 69.7 FL (ref 74–97)
MONOCYTES # BLD: 0.5 K/UL (ref 0.05–1.2)
MONOCYTES NFR BLD AUTO: 9 % (ref 3–10)
NEUTS SEG # BLD: 2.4 K/UL (ref 1.8–8)
NEUTS SEG NFR BLD AUTO: 44 % (ref 40–73)
PLATELET # BLD AUTO: 401 K/UL (ref 135–420)
PMV BLD AUTO: 8.9 FL (ref 9.2–11.8)
POTASSIUM SERPL-SCNC: 3.8 MMOL/L (ref 3.5–5.5)
RBC # BLD AUTO: 5.11 M/UL (ref 4.2–5.3)
SODIUM SERPL-SCNC: 141 MMOL/L (ref 136–145)
TROPONIN I SERPL-MCNC: <0.02 NG/ML (ref 0–0.06)
WBC # BLD AUTO: 5.4 K/UL (ref 4.6–13.2)

## 2017-06-10 PROCEDURE — 85025 COMPLETE CBC W/AUTO DIFF WBC: CPT | Performed by: EMERGENCY MEDICINE

## 2017-06-10 PROCEDURE — 82550 ASSAY OF CK (CPK): CPT | Performed by: EMERGENCY MEDICINE

## 2017-06-10 PROCEDURE — 80048 BASIC METABOLIC PNL TOTAL CA: CPT | Performed by: EMERGENCY MEDICINE

## 2017-06-10 PROCEDURE — 99284 EMERGENCY DEPT VISIT MOD MDM: CPT

## 2017-06-10 PROCEDURE — 93005 ELECTROCARDIOGRAM TRACING: CPT

## 2017-06-10 RX ORDER — CEPHALEXIN 500 MG/1
500 CAPSULE ORAL 4 TIMES DAILY
Qty: 28 CAP | Refills: 0 | Status: SHIPPED | OUTPATIENT
Start: 2017-06-10 | End: 2017-06-17

## 2017-06-10 RX ORDER — ACETAMINOPHEN AND CODEINE PHOSPHATE 300; 30 MG/1; MG/1
1 TABLET ORAL
Qty: 12 TAB | Refills: 0 | Status: SHIPPED | OUTPATIENT
Start: 2017-06-10 | End: 2018-02-10

## 2017-06-10 RX ORDER — IBUPROFEN 800 MG/1
800 TABLET ORAL
Qty: 20 TAB | Refills: 0 | Status: SHIPPED | OUTPATIENT
Start: 2017-06-10 | End: 2017-06-17

## 2017-06-10 NOTE — ED NOTES
Hourly Rounding:  Pt resting in NAD, RR equal and non-labored, side rails up x 2, bed in lowest position, call bell within reach, no needs at this time, pt continues to deny chest pain.

## 2017-06-10 NOTE — ED NOTES
Pt c/o tooth pain #32, and intermittent chest pain. Pt states she had a vaginal delivery on [birth year]/16/[birth year], with no complications,and is breast feeding. Pt denies SOB.

## 2017-06-10 NOTE — ED TRIAGE NOTES
C/o tooth pain since yesterday. States she had intermittent chest pain starting yesterday and continuing today and has been constant the last several hours. When asked why she is here she states \"I just wanted to make sure my tooth wasn't causing any problems in my chest.\"  -SOB, - nausea  NAD noted. Sepsis Screening completed    (  )Patient meets SIRS criteria. ( x)Patient does not meet SIRS criteria.       SIRS Criteria is achieved when two or more of the following are present   Temperature < 96.8°F (36°C) or > 100.9°F (38.3°C)   Heart Rate > 90 beats per minute   Respiratory Rate > 20 breaths per minute   WBC count > 12,000 or <4,000 or > 10% bands

## 2017-06-10 NOTE — DISCHARGE INSTRUCTIONS
Dental Pain: After Your Visit  Your Care Instructions  The most common cause of dental pain is tooth decay. It can also be caused by an infection of the tooth (abscess) or gum, a tooth that has not broken all the way through the gum (impacted tooth), or a problem with the nerve-filled center of the tooth. Follow-up care is a key part of your treatment and safety. Be sure to make and go to all appointments, and call your doctor if you are having problems. Its also a good idea to know your test results and keep a list of the medicines you take. How can you care for yourself at home? · Contact a dentist for follow-up care. · Put ice or a cold pack on the outside of your mouth for 10 to 20 minutes at a time to reduce pain and swelling. Put a thin cloth between the ice and your skin. · Take an over-the-counter pain medicine, such as acetaminophen (Tylenol), ibuprofen (Advil, Motrin), or naproxen (Aleve). Read and follow all instructions on the label. · Do not take two or more pain medicines at the same time unless the doctor told you to. Many pain medicines have acetaminophen, which is Tylenol. Too much acetaminophen (Tylenol) can be harmful. · Rinse your mouth with warm salt water every 2 hours to help relieve pain and swelling from an infected tooth. Mix 1 teaspoon of salt in 8 ounces of water. · If your doctor prescribed antibiotics, take them as directed. Do not stop taking them just because you feel better. You need to take the full course of antibiotics. When should you call for help? Call your doctor now or seek immediate medical care if:  · You have signs of infection, such as:  ¨ Increased pain, swelling, warmth, or redness. ¨ Pus draining from the gum, tooth, or face. ¨ A fever. Watch closely for changes in your health, and be sure to contact your doctor if:  · You do not get better as expected. Where can you learn more?    Go to Formatta.be  Enter Q364 in the search box to learn more about \"Dental Pain: After Your Visit. \"   © 1335-2968 [employer]. Care instructions adapted under license by Ayla Gandhi (which disclaims liability or warranty for this information). This care instruction is for use with your licensed healthcare professional. If you have questions about a medical condition or this instruction, always ask your healthcare professional. [address] any warranty or liability for your use of this information. Content Version: 24.2.561371; Last Revised: May 17, 2013                 Chest Pain: Care Instructions  Your Care Instructions  There are many things that can cause chest pain. Some are not serious and will get better on their own in a few days. But some kinds of chest pain need more testing and treatment. Your doctor may have recommended a follow-up visit in the next 8 to 12 hours. If you are not getting better, you may need more tests or treatment. Even though your doctor has released you, you still need to watch for any problems. The doctor carefully checked you, but sometimes problems can develop later. If you have new symptoms or if your symptoms do not get better, get medical care right away. If you have worse or different chest pain or pressure that lasts more than 5 minutes or you passed out (lost consciousness), call 911 or seek other emergency help right away. A medical visit is only one step in your treatment. Even if you feel better, you still need to do what your doctor recommends, such as going to all suggested follow-up appointments and taking medicines exactly as directed. This will help you recover and help prevent future problems. How can you care for yourself at home? · Rest until you feel better. · Take your medicine exactly as prescribed. Call your doctor if you think you are having a problem with your medicine. · Do not drive after taking a prescription pain medicine.   When should you call for help?  Call 911 if:  · You passed out (lost consciousness). · You have severe difficulty breathing. · You have symptoms of a heart attack. These may include:  ¨ Chest pain or pressure, or a strange feeling in your chest.  ¨ Sweating. ¨ Shortness of breath. ¨ Nausea or vomiting. ¨ Pain, pressure, or a strange feeling in your back, neck, jaw, or upper belly or in one or both shoulders or arms. ¨ Lightheadedness or sudden weakness. ¨ A fast or irregular heartbeat. After you call 911, the  may tell you to chew 1 adult-strength or 2 to 4 low-dose aspirin. Wait for an ambulance. Do not try to drive yourself. Call your doctor today if:  · You have any trouble breathing. · Your chest pain gets worse. · You are dizzy or lightheaded, or you feel like you may faint. · You are not getting better as expected. · You are having new or different chest pain. Where can you learn more? Go to http://macy-marianne.info/. Enter A120 in the search box to learn more about \"Chest Pain: Care Instructions. \"  Current as of: May 27, 2016  Content Version: 11.2  © 9404-3488 [employer]. Care instructions adapted under license by [facility] (which disclaims liability or warranty for this information). If you have questions about a medical condition or this instruction, always ask your healthcare professional. [address] [address] any warranty or liability for your use of this information.

## 2017-06-10 NOTE — ED PROVIDER NOTES
[address]  EMERGENCY DEPARTMENT HISTORY AND PHYSICAL EXAM       Date: 6/10/2017   Patient Name: Tameka Apple   YOB: 1990  Medical Record Number: [id number]    History of Presenting Illness     Chief Complaint   Patient presents with    Dental Pain    Chest Pain        History Provided By:  patient    Additional History:  2:32 AM  Tameka Apple is a 27 y.o. female with PMHx of asthma presenting to the ED C/O intermittent burning chest pain onset yesterday, more constant over the past several hours. Pt also C/O dental pain onset yesterday. Pt states her chest pain feels like her typical asthma flare, but she was more concerned than usual because she thinks she has a dental abscess. She states she has not used an inhaler in many years. Pt had vaginal delivery on  without complications, and is currently breastfeeding. Other PMHx includes gestational DM (not treated with medication). FHx includes asthma (father). Pt denies SOB, nausea, illicit drug use, EtOH use, tobacco use, drug allergies, and any other symptoms or complaints at this time. Primary Care Provider: None     Past History     Past Medical History:   Past Medical History:   Diagnosis Date    Abnormal Papanicolaou smear of cervix          Asthma     Ectopic pregnancy 2008    Eczema     Pregnancy 2006    Rh negative state in antepartum period         Past Surgical History:   History reviewed. No pertinent surgical history.      Family History:   Family History   Problem Relation Age of Onset    High Cholesterol Mother     Hypertension Mother     Other Father     Asthma Father     Diabetes Maternal Grandfather     Cancer Maternal Grandmother     Cancer Paternal Grandmother         Social History:   Social History   Substance Use Topics    Smoking status: Former Smoker     Packs/day: 0.25     Years: 4.00     Quit date: 3/18/2015    Smokeless tobacco: Former User Quit date: 7/18/2015    Alcohol use No      Comment: Stopped when found out pregnant        Allergies:   No Known Allergies     Review of Systems   Review of Systems   HENT: Positive for dental problem. Respiratory: Negative for shortness of breath. Cardiovascular: Positive for chest pain. Gastrointestinal: Negative for nausea. All other systems reviewed and are negative. Physical Exam  Vitals:    06/10/17 0134 06/10/17 0206   BP: 115/77 125/81   Pulse: 67 66   Resp: 18 19   Temp: 97.8 °F (36.6 °C)    SpO2: 100% 100%   Weight: 78.5 kg (173 lb)    Height: 5' 1\" (1.549 m)        Physical Exam   Constitutional: She is oriented to person, place, and time. She appears well-developed and well-nourished. No distress. HENT:   Head: Normocephalic and atraumatic. Right Ear: External ear normal.   Left Ear: External ear normal.   Mouth/Throat: Oropharynx is clear and moist. No oropharyngeal exudate. Minimal inflammation along the oral mucosa at tooth # 30. Eyes: Conjunctivae and EOM are normal. Pupils are equal, round, and reactive to light. No scleral icterus. No pallor   Neck: Normal range of motion. Neck supple. No JVD present. No tracheal deviation present. No thyromegaly present. Cardiovascular: Normal rate, regular rhythm and normal heart sounds. Pulmonary/Chest: Effort normal and breath sounds normal. No stridor. No respiratory distress. Abdominal: Soft. Bowel sounds are normal. She exhibits no distension. There is no tenderness. There is no rebound and no guarding. Musculoskeletal: Normal range of motion. She exhibits no edema or tenderness. No soft tissue injuries   Lymphadenopathy:     She has no cervical adenopathy. Neurological: She is alert and oriented to person, place, and time. She has normal reflexes. No cranial nerve deficit. Coordination normal.   Skin: Skin is warm and dry. No rash noted. She is not diaphoretic. No erythema.    Psychiatric: She has a normal mood and affect. Her behavior is normal. Judgment and thought content normal.   Nursing note and vitals reviewed. Diagnostic Study Results     Labs -      Recent Results (from the past 12 hour(s))   EKG, 12 LEAD, INITIAL    Collection Time: 06/10/17  1:59 AM   Result Value Ref Range    Ventricular Rate 54 BPM    Atrial Rate 54 BPM    P-R Interval 164 ms    QRS Duration 88 ms    Q-T Interval 424 ms    QTC Calculation (Bezet) 402 ms    Calculated P Axis 0 degrees    Calculated R Axis 8 degrees    Calculated T Axis 9 degrees    Diagnosis       Sinus bradycardia with sinus arrhythmia  Minimal voltage criteria for LVH, may be normal variant  Borderline ECG  When compared with ECG of 27-MAR-2017 17:57,  Vent. rate has decreased BY  27 BPM  QT has shortened     CBC WITH AUTOMATED DIFF    Collection Time: 06/10/17  2:00 AM   Result Value Ref Range    WBC 5.4 4.6 - 13.2 K/uL    RBC 5.11 4.20 - 5.30 M/uL    HGB 11.1 (L) 12.0 - 16.0 g/dL    HCT 35.6 35.0 - 45.0 %    MCV 69.7 (L) 74.0 - 97.0 FL    MCH 21.7 (L) 24.0 - 34.0 PG    MCHC 31.2 31.0 - 37.0 g/dL    RDW 17.6 (H) 11.6 - 14.5 %    PLATELET 011 735 - 645 K/uL    MPV 8.9 (L) 9.2 - 11.8 FL    NEUTROPHILS 44 40 - 73 %    LYMPHOCYTES 40 21 - 52 %    MONOCYTES 9 3 - 10 %    EOSINOPHILS 7 (H) 0 - 5 %    BASOPHILS 0 0 - 2 %    ABS. NEUTROPHILS 2.4 1.8 - 8.0 K/UL    ABS. LYMPHOCYTES 2.1 0.9 - 3.6 K/UL    ABS. MONOCYTES 0.5 0.05 - 1.2 K/UL    ABS. EOSINOPHILS 0.4 0.0 - 0.4 K/UL    ABS.  BASOPHILS 0.0 0.0 - 0.06 K/UL    DF AUTOMATED     METABOLIC PANEL, BASIC    Collection Time: 06/10/17  2:00 AM   Result Value Ref Range    Sodium 141 136 - 145 mmol/L    Potassium 3.8 3.5 - 5.5 mmol/L    Chloride 105 100 - 108 mmol/L    CO2 24 21 - 32 mmol/L    Anion gap 12 3.0 - 18 mmol/L    Glucose 75 74 - 99 mg/dL    BUN 14 7.0 - 18 MG/DL    Creatinine 0.55 (L) 0.6 - 1.3 MG/DL    BUN/Creatinine ratio 25 (H) 12 - 20      GFR est AA >60 >60 ml/min/1.73m2    GFR est non-AA >60 >60 ml/min/1.73m2 Calcium 8.9 8.5 - 10.1 MG/DL   CARDIAC PANEL,(CK, CKMB & TROPONIN)    Collection Time: 06/10/17  2:00 AM   Result Value Ref Range     26 - 192 U/L    CK - MB <1.0 <3.6 ng/ml    CK-MB Index Cannot be calulated 0.0 - 4.0 %    Troponin-I, Qt. <0.02 0.00 - 0.06 NG/ML       Radiologic Studies -  The following have been ordered and reviewed:  No orders to display           Medical Decision Making   I am the first provider for this patient. I reviewed the vital signs, available nursing notes, past medical history, past surgical history, family history and social history. Vital Signs-Reviewed the patient's vital signs. Patient Vitals for the past 12 hrs:   Temp Pulse Resp BP SpO2   06/10/17 0206 - 66 19 125/81 100 %   06/10/17 0134 97.8 °F (36.6 °C) 67 18 115/77 100 %       Pulse Oximetry Analysis - Normal 100% on room air     EKG interpretation: (Preliminary)  Rhythm: Sinus bradycardia. Rate (approx.): 54 bpm; Borderline LVH   EKG read by Yue. Aubrey Peña MD at 1:59 AM    Old Medical Records: Old medical records. Nursing notes. Provider Notes:   DDx: Atypical CP, unliekly for PE. Will check EKG and lab work in support of myocarditis. Procedures:   Procedures    ED Course:  2:32 AM  Initial assessment performed. The patients presenting problems have been discussed, and they are in agreement with the care plan formulated and outlined with them. I have encouraged them to ask questions as they arise throughout their visit. Medications Given in the ED:  Medications - No data to display    Discharge Note:  3:35 AM   Pt has been reexamined. Patient has no new complaints, changes, or physical findings. Care plan outlined and precautions discussed. Results were reviewed with the patient. All medications were reviewed with the patient; will d/c home. All of pt's questions and concerns were addressed.  Patient was instructed and agrees to follow up with PCP, as well as to return to the ED upon further deterioration. Patient is ready to go home. Diagnosis   Clinical Impression:   1. Atypical chest pain    2. H/O intrinsic asthma    3. Pain, dental         Follow-up Information     Follow up With Details Comments [address] [facility], DO Call in 2 days Your primary doctor or the one listed [address]      [facility]  As needed, If symptoms worsen [address] [place] [address]  [phone]          Current Discharge Medication List      START taking these medications    Details   cephALEXin (KEFLEX) 500 mg capsule Take 1 Cap by mouth four (4) times daily for 7 days. Qty: 28 Cap, Refills: 0      acetaminophen-codeine (TYLENOL-CODEINE #3) 300-30 mg per tablet Take 1 Tab by mouth every four (4) hours as needed for Pain. Max Daily Amount: 6 Tabs. Qty: 12 Tab, Refills: 0      ibuprofen (MOTRIN) 800 mg tablet Take 1 Tab by mouth every six (6) hours as needed for Pain for up to 7 days. Qty: 20 Tab, Refills: 0         CONTINUE these medications which have NOT CHANGED    Details   PNV no.24-iron-folic acid-dha -386 mg-mcg-mg cmpk Take 1 Tab by mouth daily. Qty: 90 Tab, Refills: 4    Associated Diagnoses: Pre-conception counseling      Blood-Glucose Meter monitoring kit Please use to check blood glucose 4 times a day. Fastin gin AM and two hours after each meal.  Qty: 1 Kit, Refills: 0    Comments: Please dispense any glucometer covered by insurance. Associated Diagnoses: Gestational diabetes mellitus (GDM) in third trimester, gestational diabetes method of control unspecified      Lancets misc Please use to check blood glucose 4 times a day.  Fastin gin AM and two hours after each meal.  Qty: 100 Each, Refills: 11    Associated Diagnoses: Gestational diabetes mellitus (GDM) in third trimester, gestational diabetes method of control unspecified      glucose blood VI test strips (BLOOD GLUCOSE TEST) strip Please use to check blood glucose 4 times a day. Fastin gin AM and two hours after each meal.  Qty: 100 Strip, Refills: 11    Associated Diagnoses: Gestational diabetes mellitus (GDM) in third trimester, gestational diabetes method of control unspecified             _______________________________   Attestations: This note is prepared by Layne Newell, acting as a Scribe for [employer]. William Smith MD on 2:32 AM on  .    [employer]. William Smith MD: The scribe's documentation has been prepared under my direction and personally reviewed by me in its entirety.   _______________________________

## 2017-06-11 LAB
ATRIAL RATE: 54 BPM
CALCULATED P AXIS, ECG09: 0 DEGREES
CALCULATED R AXIS, ECG10: 8 DEGREES
CALCULATED T AXIS, ECG11: 9 DEGREES
DIAGNOSIS, 93000: NORMAL
P-R INTERVAL, ECG05: 164 MS
Q-T INTERVAL, ECG07: 424 MS
QRS DURATION, ECG06: 88 MS
QTC CALCULATION (BEZET), ECG08: 402 MS
VENTRICULAR RATE, ECG03: 54 BPM

## 2018-02-10 ENCOUNTER — HOSPITAL ENCOUNTER (EMERGENCY)
Age: 28
Discharge: HOME | End: 2018-02-10
Attending: EMERGENCY MEDICINE
Payer: MEDICAID

## 2018-02-10 VITALS
OXYGEN SATURATION: 100 % | TEMPERATURE: 98.5 F | HEART RATE: 100 BPM | WEIGHT: 162 LBS | HEIGHT: 61 IN | BODY MASS INDEX: 30.58 KG/M2 | RESPIRATION RATE: 16 BRPM | SYSTOLIC BLOOD PRESSURE: 112 MMHG | DIASTOLIC BLOOD PRESSURE: 67 MMHG

## 2018-02-10 DIAGNOSIS — N61.0 MASTITIS, LEFT, ACUTE: Primary | ICD-10-CM

## 2018-02-10 PROCEDURE — 99282 EMERGENCY DEPT VISIT SF MDM: CPT

## 2018-02-10 RX ORDER — DICLOXACILLIN SODIUM 500 MG/1
500 CAPSULE ORAL 4 TIMES DAILY
Qty: 40 CAP | Refills: 0 | Status: SHIPPED | OUTPATIENT
Start: 2018-02-10 | End: 2018-02-20

## 2018-02-10 NOTE — DISCHARGE INSTRUCTIONS
Mastitis: Care Instructions  Your Care Instructions  Mastitis is an inflammation of the breast. It occurs most often in women who are breastfeeding, but it can affect any woman. Mastitis can be caused by poor milk flow from the breast. When milk builds up in a breast, it leaks into the nearby breast tissue. Infection can also develop when the nipples become cracked or irritated. The tissue can then become infected with bacteria. Antibiotics can usually cure mastitis. For women who are nursing, continued breastfeeding (or pumping) can help. If mastitis is not treated, a pocket of pus may form in the breast and need to be drained. Follow-up care is a key part of your treatment and safety. Be sure to make and go to all appointments, and call your doctor if you are having problems. It's also a good idea to know your test results and keep a list of the medicines you take. How can you care for yourself at home? · If your doctor prescribed antibiotics, take them as directed. Do not stop taking them just because you feel better. You need to take the full course of antibiotics. · If you are breastfeeding, continue breastfeeding or pumping breast milk. It is important to empty your breasts regularly, every 2 to 3 hours while you are awake. These tips may help:  ¨ Before breastfeeding, place a warm, wet washcloth over your breast for about 15 minutes. Try this at least 3 times a day. This increases milk flow in the breast. Massaging the affected breast may also increase milk flow. ¨ Breastfeed on both sides. Try to start with your healthy breast first. Then, after your milk is flowing, breastfeed from the affected breast until it feels soft. You should empty this breast completely. Then switch back to the healthy breast and breastfeed until your baby has finished. ¨ Pump or hand-express a small amount of breast milk before breastfeeding if your breasts are too full with milk.  This will make your breasts less full and may make it easier for your baby to latch on to your breast.  ¨ Pump or express milk from the affected breast if it hurts too much to breastfeed. · Take an over-the-counter pain medicine, such as acetaminophen (Tylenol) or ibuprofen (Advil, Motrin) to relieve pain and fever. Read and follow all instructions on the label. · Do not take two or more pain medicines at the same time unless the doctor told you to. Many pain medicines have acetaminophen, which is Tylenol. Too much acetaminophen (Tylenol) can be harmful. · Rest as much as possible. · Drink extra fluids. · If pus is draining from your infected breast, wash the nipple gently and let it air-dry before you put your bra back on. A disposable breast pad placed in the bra cup may absorb the pus. When should you call for help? Call your doctor now or seek immediate medical care if:  ? · You have worse symptoms of a breast infection, such as:  ¨ Increased pain, swelling, redness, or warmth around a breast.  ¨ Red streaks leading from a breast.  ¨ Pus draining from a breast.  ¨ A fever. ? Watch closely for changes in your health, and be sure to contact your doctor if:  ? · You do not get better as expected. Where can you learn more? Go to http://macy-marianne.info/. Enter Y207 in the search box to learn more about \"Mastitis: Care Instructions. \"  Current as of: March 16, 2017  Content Version: 11.4  © 1963-1154 [email or web address]. Care instructions adapted under license by [employer] (which disclaims liability or warranty for this information). If you have questions about a medical condition or this instruction, always ask your healthcare professional. [place] [address] any warranty or liability for your use of this information.

## 2018-02-10 NOTE — ED PROVIDER NOTES
EMERGENCY DEPARTMENT HISTORY AND PHYSICAL EXAM    Date: 2/10/2018  Patient Name: Johana Carpenter    History of Presenting Illness     Chief Complaint   Patient presents with    Breast Problem         History Provided By: Patient    Chief Complaint: Breast Pain  Duration: 10 Hours  Timing:  Acute  Location: Left  Quality: Aching  Severity: 6 out of 10  Modifying Factors: Pt has used warm compresses with mild relief  Associated Symptoms: chills, body aches, headache, nausea and vomiting    Additional History (Context):   1:41 PM  Johana Carpenter is a 32 y.o. female  who presents to the emergency department C/O left breast pain onset 10 hours ago. Pt states she thinks she has mastitis. Associated sxs include chills, body aches, headache, nausea, vomiting. Pt states she has not had mastitis before. Pt states she has done warm compresses Pt denies allergies to medications, fever, and any other sxs or complaints. PCP: None    Current Outpatient Prescriptions   Medication Sig Dispense Refill    dicloxacillin (DYNAPEN) 500 mg capsule Take 1 Cap by mouth four (4) times daily for 10 days. Indications: MASTITIS 40 Cap 0       Past History     Past Medical History:  Past Medical History:   Diagnosis Date    Abnormal Papanicolaou smear of cervix      2011    Asthma     Ectopic pregnancy 2008    Eczema     Gestational diabetes     Pregnancy 2006    Rh negative state in antepartum period        Past Surgical History:  History reviewed. No pertinent surgical history.     Family History:  Family History   Problem Relation Age of Onset    High Cholesterol Mother     Hypertension Mother     Other Father     Asthma Father     Diabetes Maternal Grandfather     Cancer Maternal Grandmother     Cancer Paternal Grandmother        Social History:  Social History   Substance Use Topics    Smoking status: Former Smoker     Packs/day: 0.25     Years: 4.00     Quit date: 3/18/2015    Smokeless tobacco: Former User     Quit date: 7/18/2015    Alcohol use 0.6 oz/week     0 Standard drinks or equivalent, 1 Glasses of wine per week      Comment: Stopped when found out pregnant       Allergies:  No Known Allergies      Review of Systems   Review of Systems   Constitutional: Positive for chills. Negative for fever. Gastrointestinal: Positive for nausea and vomiting. Musculoskeletal: Positive for myalgias (breast pain). Neurological: Positive for headaches. All other systems reviewed and are negative. Physical Exam     Vitals:    02/10/18 1322   BP: 112/67   Pulse: 100   Resp: 16   Temp: 98.5 °F (36.9 °C)   SpO2: 100%   Weight: 73.5 kg (162 lb)   Height: 5' 1\" (1.549 m)     Physical Exam   Constitutional: She is oriented to person, place, and time. Vital signs are normal. She appears well-developed and well-nourished. Non-toxic appearance. She does not have a sickly appearance. She does not appear ill. No distress. HENT:   Head: Normocephalic and atraumatic. Eyes: Conjunctivae are normal.   Neck: Normal range of motion. Neck supple. Cardiovascular: Normal rate, regular rhythm and normal heart sounds. Pulmonary/Chest: Effort normal and breath sounds normal. No respiratory distress. She has no wheezes. She has no rales. She exhibits no tenderness. Left breast exhibits skin change and tenderness. Left breast exhibits no inverted nipple, no mass and no nipple discharge. Approximate ~2cm non-fluctuant mass with mild erythema and moderate tenderness to left inferior breast (approximately 7 o'clock position). No warmth or underlying fluctuance. No pus/drainage expressed from nipple. Musculoskeletal: Normal range of motion. Neurological: She is alert and oriented to person, place, and time. Skin: Skin is warm and dry. She is not diaphoretic. Psychiatric: She has a normal mood and affect. Her behavior is normal.   Nursing note and vitals reviewed.         Diagnostic Study Results     Labs - No results found for this or any previous visit (from the past 12 hour(s)). Radiologic Studies -   No orders to display     CT Results  (Last 48 hours)    None        CXR Results  (Last 48 hours)    None          Medications given in the ED-  Medications - No data to display      Medical Decision Making   I am the first provider for this patient. I reviewed the vital signs, available nursing notes, past medical history, past surgical history, family history and social history. Vital Signs-Reviewed the patient's vital signs. Pulse Oximetry Analysis - 100% on Room Air     Records Reviewed: Nursing Notes    Provider Notes (Medical Decision Making):     Procedures:  Procedures    ED Course:   1:41 PM   Initial assessment performed. The patients presenting problems have been discussed, and they are in agreement with the care plan formulated and outlined with them. I have encouraged them to ask questions as they arise throughout their visit. Diagnosis and Disposition       DISCHARGE NOTE:  1:55 PM  Michelle Gordon's  results have been reviewed with her. She has been counseled regarding her diagnosis, treatment, and plan. She verbally conveys understanding and agreement of the signs, symptoms, diagnosis, treatment and prognosis and additionally agrees to follow up as discussed. She also agrees with the care-plan and conveys that all of her questions have been answered. I have also provided discharge instructions for her that include: educational information regarding their diagnosis and treatment, and list of reasons why they would want to return to the ED prior to their follow-up appointment, should her condition change. She has been provided with education for proper emergency department utilization. CLINICAL IMPRESSION:    1. Mastitis, left, acute        PLAN:  1. D/C Home  2.    Discharge Medication List as of 2/10/2018  2:05 PM      START taking these medications    Details   dicloxacillin (DYNAPEN) 500 mg capsule Take 1 Cap by mouth four (4) times daily for 10 days. Indications: MASTITIS, Normal, Disp-40 Cap, R-0           3. Follow-up Information     Follow up With Details Comments [address] [facility], DO Schedule an appointment as soon as possible for a visit in 2 days For primary care follow up [address] [facility]      [facility] EMERGENCY DE[facility] Go to As needed, if symptoms worsen [address] [address] [phone]        _______________________________    Attestations: This note is prepared by Dayana Higgins, acting as Scribe for Gabby Pham PA-C. Gabby Pham PA-C:  The scribe's documentation has been prepared under my direction and personally reviewed by me in its entirety.   I confirm that the note above accurately reflects all work, treatment, procedures, and medical decision making performed by me.  _______________________________

## 2018-02-10 NOTE — LETTER
[facility] [place] 
[facility] EMERGENCY DEPT 
[address] [address] 
[phone] Work/School Note Date: 2/10/2018 To Whom It May concern: 
 
Honey Geiger was seen and treated today in the emergency room by the following provider(s): 
Attending Provider: Tri Delgadillo MD 
Physician Assistant: Kita Sellers. Honey Geiger may return to school on 2/12/2018. Sincerely, Denise Shah PA-C.

## 2018-02-10 NOTE — ED TRIAGE NOTES
C/o lt breast pain, pt states breast fed this am, pt reports she thinks she has mastitis in lt breast, c/o headache, chills, body aches. Sepsis Screening completed    (  )Patient meets SIRS criteria. (x  )Patient does not meet SIRS criteria.       SIRS Criteria is achieved when two or more of the following are present   Temperature < 96.8°F (36°C) or > 100.9°F (38.3°C)   Heart Rate > 90 beats per minute   Respiratory Rate > 20 breaths per minute   WBC count > 12,000 or <4,000 or > 10% bands

## 2018-02-10 NOTE — ED NOTES
Pt ACI reviewed and pt verbalizes understanding. Pt discharged with copy. Pt discharged in nad with steady gait from ER. Opportunity for questions provided.

## 2018-02-10 NOTE — ED NOTES
Pt with c/o left breast mastitis while breastfeeding.   Pt with small amount of redness noted to underside of breast.

## 2018-12-06 NOTE — ED NOTES
I have reviewed discharge instructions with the patient. The patient verbalized understanding. Patient armband removed and shredded. Telephone Encounter by Jeniffer Kowalski at 09/19/17 11:44 AM     Author:  Jeniffer Kowalski Service:  (none) Author Type:       Filed:  09/19/17 11:46 AM Encounter Date:  9/19/2017 Status:  Signed     :  Jeniffer Kowalski ()              CHIDI BREWSTER    Patient Age: 36 year old   Refill request by:[JC1.1T] Phone.  Patient wants a call back? Yes -  Ok to leave response (including medical information) with family member or on answering machine[JC1.1M]  Refill to be:[JC1.1T] ePrescribed to[JC1.1M]   [facility] [address]    [address] [facility] [address]    Phone: [phone][JC1.2T]        Patient states she needs her medication today. Patient states the pharmacy had sent us request and they were waiting to hear back from us(adivsed no message seen) patient wants a call back once Rx has been sent to the pharmacy.[JC1.1M] Message confirmed with caller.[JC1.1T]     To Dr Сергей beckham[JC1.1M]    Medication requested to be refilled:[JC1.1T]   Requested Prescriptions     Pending Prescriptions Disp Refills   • naproxen (NAPROSYN) 500 MG tablet 30 Tab 0     Sig: Take  1 bid x 7 days, then prn.[JC1.2T]           Next and Last Visit with Provider and Department  Next visit with IMMANUEL SOTO is on No match found  Next visit with [facility] is on No match found   Last visit with IMMANUEL SOTO was on 08/22/2017 at  1:00 PM in [facility] SEQ  Last visit with FAMILY PRACTICE was on 08/22/2017 at  1:00 PM in [facility] SEQ      WEIGHT AND HEIGHT: As of 08/22/2017 weight is 142 lbs.(64.411 kg). Height is 5' 2\"(1.575 m).   BMI is 25.97 kg/(m^2) calculated from:     Height 5' 2\" (1.575 m) as of 8/22/17     Weight 142 lb (64.411 kg) as of 8/22/17[JC1.1T]      No Known Allergies[JC1.2T]  Current outpatient prescriptions       Medication  Sig Dispense Refill   • naproxen (NAPROSYN)  500 MG tablet Take  1 bid x 7 days, then prn. 30 Tab 0   • Topiramate (TOPAMAX) 25 MG tablet Take 1 Tab by mouth 2 (two) times daily. During first week, take one tab in evening. Second week take 1 tab po BID 60 Tab 2   • sumatriptan (IMITREX) 25 MG tablet Take 1 Tab by mouth as needed for Migraine. May repeat in 2 hours if pain persists. Do not exceed 200 mg/24 hour period 10 Tab 1   • ergocalciferol (ERGOCALCIFEROL) 44657 UNITS Cap Take 1 Cap by mouth once a week. 12 Cap 0   • hydroquinone 4 % cream Apply bid to dark spots, avoid normal skin 28.35 g 3   • IBUPROFEN 200 OR Take  by mouth.     • Menthol, Topical Analgesic, (ICY HOT EX) Apply  topically.          ROUTING:[JC1.1T] Patient's physician/staff[JC1.1M]        PCP: Bessy Rushing MD         INS: Payor: [employer] / Plan: B [id number] / Product Type: *No Product type* / Note: This is the primary coverage, but no account was found for this location or the patient's primary location.   ADDRESS:  [address] [place] [id number][JC1.1T]         Revision History        User Key Date/Time User Provider Type Action    > JC1.2 09/19/17 11:46 AM Jeniffer Kowalski  Sign     JC1.1 09/19/17 11:44 AM Jeniffer Kowalski      M - Manual, T - Template

## 2020-04-16 ENCOUNTER — HOSPITAL ENCOUNTER (EMERGENCY)
Age: 30
Discharge: HOME | End: 2020-04-16
Attending: EMERGENCY MEDICINE
Payer: MEDICAID

## 2020-04-16 VITALS
TEMPERATURE: 98.2 F | HEART RATE: 85 BPM | OXYGEN SATURATION: 98 % | SYSTOLIC BLOOD PRESSURE: 124 MMHG | HEIGHT: 62 IN | WEIGHT: 194.2 LBS | DIASTOLIC BLOOD PRESSURE: 71 MMHG | RESPIRATION RATE: 18 BRPM | BODY MASS INDEX: 35.74 KG/M2

## 2020-04-16 DIAGNOSIS — R06.2 WHEEZING: Primary | ICD-10-CM

## 2020-04-16 DIAGNOSIS — Z3A.30 30 WEEKS GESTATION OF PREGNANCY: ICD-10-CM

## 2020-04-16 DIAGNOSIS — Z87.09 H/O INTRINSIC ASTHMA: ICD-10-CM

## 2020-04-16 PROCEDURE — 74011250637 HC RX REV CODE- 250/637: Performed by: EMERGENCY MEDICINE

## 2020-04-16 PROCEDURE — 93005 ELECTROCARDIOGRAM TRACING: CPT

## 2020-04-16 PROCEDURE — 99283 EMERGENCY DEPT VISIT LOW MDM: CPT

## 2020-04-16 PROCEDURE — 94640 AIRWAY INHALATION TREATMENT: CPT

## 2020-04-16 RX ORDER — ALBUTEROL SULFATE 90 UG/1
1 AEROSOL, METERED RESPIRATORY (INHALATION) ONCE
Status: COMPLETED | OUTPATIENT
Start: 2020-04-16 | End: 2020-04-16

## 2020-04-16 RX ADMIN — ALBUTEROL SULFATE 1 PUFF: 90 AEROSOL, METERED RESPIRATORY (INHALATION) at 21:22

## 2020-04-17 ENCOUNTER — PATIENT OUTREACH (OUTPATIENT)
Dept: CASE MANAGEMENT | Age: 30
End: 2020-04-17

## 2020-04-17 LAB
ATRIAL RATE: 94 BPM
CALCULATED P AXIS, ECG09: -2 DEGREES
CALCULATED R AXIS, ECG10: 25 DEGREES
CALCULATED T AXIS, ECG11: 2 DEGREES
DIAGNOSIS, 93000: NORMAL
P-R INTERVAL, ECG05: 144 MS
Q-T INTERVAL, ECG07: 350 MS
QRS DURATION, ECG06: 78 MS
QTC CALCULATION (BEZET), ECG08: 437 MS
VENTRICULAR RATE, ECG03: 94 BPM

## 2020-04-17 NOTE — ED TRIAGE NOTES
Arrives ambulatory to the ed with co chest pain and sob. Pt is 30 weeks preg tomorrow with her 3rd child.

## 2020-04-17 NOTE — DISCHARGE INSTRUCTIONS
Patient Education        Wheezing or Bronchoconstriction: Care Instructions  Your Care Instructions  Wheezing is a whistling noise made during breathing. It occurs when the small airways, or bronchial tubes, that lead to your lungs swell or contract (spasm) and become narrow. This narrowing is called bronchoconstriction. When your airways constrict, it is hard for air to pass through and this makes it hard for you to breathe. Wheezing and bronchoconstriction can be caused by many problems, including:  · An infection such as the flu or a cold. · Allergies such as hay fever. · Diseases such as asthma or chronic obstructive pulmonary disease. · Smoking. Treatment for your wheezing depends on what is causing the problem. Your wheezing may get better without treatment. But you may need to pay attention to things that cause your wheezing and avoid them. Or you may need medicine to help treat the wheezing and to reduce the swelling or to relieve spasms in your lungs. Follow-up care is a key part of your treatment and safety. Be sure to make and go to all appointments, and call your doctor if you are having problems. It is also a good idea to know your test results and keep a list of the medicines you take. How can you care for yourself at home? · Take your medicine exactly as prescribed. Call your doctor if you think you are having a problem with your medicine. You will get more details on the specific medicine your doctor prescribes. · If your doctor prescribed antibiotics, take them as directed. Do not stop taking them just because you feel better. You need to take the full course of antibiotics. · Breathe moist air from a humidifier, hot shower, or sink filled with hot water. This may help ease your symptoms and make it easier for you to breathe. · If you have congestion in your nose and throat, drinking plenty of fluids, especially hot fluids, may help relieve your symptoms.  If you have kidney, heart, or liver disease and have to limit fluids, talk with your doctor before you increase the amount of fluids you drink. · If you have mucus in your airways, it may help to breathe deeply and cough. · Do not smoke or allow others to smoke around you. Smoking can make your wheezing worse. If you need help quitting, talk to your doctor about stop-smoking programs and medicines. These can increase your chances of quitting for good. · Avoid things that may cause your wheezing. These may include colds, smoke, air pollution, dust, pollen, pets, cockroaches, stress, and cold air. When should you call for help? Call 911 anytime you think you may need emergency care. For example, call if:    · You have severe trouble breathing.     · You passed out (lost consciousness).    Call your doctor now or seek immediate medical care if:    · You cough up yellow, dark brown, or bloody mucus (sputum).     · You have new or worse shortness of breath.     · Your wheezing is not getting better or it gets worse after you start taking your medicine.    Watch closely for changes in your health, and be sure to contact your doctor if:    · You do not get better as expected. Where can you learn more? Go to http://macy-marianne.info/  Enter V454 in the search box to learn more about \"Wheezing or Bronchoconstriction: Care Instructions. \"  Current as of: June 9, 2019Content Version: 12.4  © 0677-6913 Healthwise, Incorporated. Care instructions adapted under license by [employer] (which disclaims liability or warranty for this information). If you have questions about a medical condition or this instruction, always ask your healthcare professional. [place] [address] any warranty or liability for your use of this information. Patient Education     Asthma Action Plan: After Your Visit  Your Care Instructions  An asthma action plan is based on peak flow and asthma symptoms.  Sorting symptoms and peak flow into red, yellow, and green \"zones\" can help you know how bad your asthma is and what actions you should take. Work with your doctor to make your plan. An action plan may include:  · The peak flow readings and symptoms for each zone. · What medicines to take in each zone. · When to call a doctor. · A list of emergency contact numbers. · A list of your asthma triggers. Follow-up care is a key part of your treatment and safety. Be sure to make and go to all appointments, and call your doctor if you are having problems. It's also a good idea to know your test results and keep a list of the medicines you take. How can you care for yourself at home? · Take your daily medicines to help minimize long-term damage and avoid asthma attacks. · Check your peak flow every morning and evening. This is the best way to know how well your lungs are working. · Check your action plan to see what zone you are in.  ¨ If you are in the green zone, keep taking your daily asthma medicines as prescribed. ¨ If you are in the yellow zone, you may be having or will soon have an asthma attack. You may not have any symptoms, but your lungs are not working as well as they should. Take the medicines listed in your action plan. If you stay in the yellow zone, your doctor may need to increase the dose or add a medicine. ¨ If you are in the red zone, follow your action plan. If your symptoms or peak flow don't improve soon, you may need to go to the emergency room or be admitted to the hospital.  · Use an asthma diary. Write down your peak flow readings in the asthma diary. If you have an attack, write down what caused it (if you know), the symptoms, and what medicine you took. · Make sure you know how and when to call your doctor or go to the hospital.  · Take both the asthma action plan and the asthma diary--along with your peak flow meter and medicines--when you see your doctor.  Tell your doctor if you are having trouble following your action plan. When should you call for help? Call 911 anytime you think you may need emergency care. For example, call if:  · You have severe trouble breathing. Call your doctor now or seek immediate medical care if:  · Your symptoms do not get better after you have followed your asthma action plan. · You cough up yellow, dark brown, or bloody mucus (sputum). Watch closely for changes in your health, and be sure to contact your doctor if:  · Your coughing and wheezing get worse. · You need to use quick-relief medicine on more than 2 days a week (unless it is just for exercise). · You need help figuring out what is triggering your asthma attacks. Where can you learn more? Go to Kiwiple.be  Enter B511 in the search box to learn more about \"Asthma Action Plan: After Your Visit. \"   © 6843-4963 Healthwise, Incorporated. Care instructions adapted under license by [employer] (which disclaims liability or warranty for this information). This care instruction is for use with your licensed healthcare professional. If you have questions about a medical condition or this instruction, always ask your healthcare professional. [place] [address] any warranty or liability for your use of this information. Content Version: 86.0.546491;  Last Revised: March 9, 2012

## 2020-04-17 NOTE — PROGRESS NOTES
Patient contacted regarding recent discharge and COVID-19 risk   Care Transition Nurse/ Ambulatory Care Manager contacted the patient by telephone to perform post discharge assessment. Verified name and  with patient as identifiers. Patient has following risk factors of: asthma. CTN/ACM reviewed discharge instructions, medical action plan and red flags related to discharge diagnosis. Reviewed and educated them on any new and changed medications related to discharge diagnosis. Advised obtaining a 90-day supply of all daily and as-needed medications. Education provided regarding infection prevention, and signs and symptoms of COVID-19 and when to seek medical attention with patient who verbalized understanding. Discussed exposure protocols and quarantine from [address] you at higher risk for severe illness  and given an opportunity for questions and concerns. The patient agrees to contact the COVID-19 hotline [phone] or PCP office for questions related to their healthcare. CTN/ACM provided contact information for future reference. From CDC: Are you at higher risk for severe illness?  Wash your hands often.  Avoid close contact (6 feet, which is about two arm lengths) with people who are sick.  Put distance between yourself and other people if COVID-19 is spreading in your community.  Clean and disinfect frequently touched surfaces.  Avoid all cruise travel and non-essential air travel.  Call your healthcare professional if you have concerns about COVID-19 and your underlying condition or if you are sick. For more information on steps you can take to protect yourself, see CDC's How to Protect Yourself      Patient/family/caregiver given information for Didi Flores and agrees to enroll no    Plan for follow-up call in 7-14 days based on severity of symptoms and risk factors.

## 2020-04-17 NOTE — ED PROVIDER NOTES
EMERGENCY DEPARTMENT HISTORY AND PHYSICAL EXAM    Date: 2020  Patient Name: Rj Burton    History of Presenting Illness     Chief Complaint   Patient presents with    Shortness of Breath         History Provided By: Patient    Additional History (Context):     8:22 PM    Rj Burton is a  30 y.o. female with pertinent PMHx of asthma and acid reflux presenting ambulatory to the ED c/o SOB x the last few days. It is worse with exertion, but it still present at rest. Pt notes associated symptoms of chest tightness, wheezing, and dry cough; but denies fever/chills or N/V/D. Pt denies any sick contacts with similar sxs. Pt is ~30 weeks pregnant, but denies abdominal pain or vaginal bleeding. She has had persistent GERD sxs during her pregnancy. She is scheduled for an iron infusion next week for her anemia tested last week. Pt's past pregnancies (2 other children) have been uneventful. Pt denies any h/o blood clots. Pt denies any allergies to medications. Pt notes a FHx of asthma and eczema. Pt does not smoke tobacco, drink EtOH excessively, or do illicit drugs. There are no other complaints, changes, or physical findings at this time. Past History     Past Medical History:  Past Medical History:   Diagnosis Date    Abnormal Papanicolaou smear of cervix          Asthma     Ectopic pregnancy     Eczema     Gestational diabetes     Pregnancy 2006    Rh negative state in antepartum period        Past Surgical History:  History reviewed. No pertinent surgical history.     Family History:  Family History   Problem Relation Age of Onset    High Cholesterol Mother     Hypertension Mother     Other Father     Asthma Father     Diabetes Maternal Grandfather     Cancer Maternal Grandmother     Cancer Paternal Grandmother        Social History:  Social History     Tobacco Use    Smoking status: Former Smoker     Packs/day: 0.25     Years: 4.00     Pack years: 1.00 Last attempt to quit: 3/18/2015     Years since quittin.0    Smokeless tobacco: Former User     Quit date: 2015   Substance Use Topics    Alcohol use: Yes     Alcohol/week: 1.0 standard drinks     Types: 1 Glasses of wine per week     Comment: Stopped when found out pregnant    Drug use: No     Frequency: 2.0 times per week     Types: Marijuana     Comment: Stopped 2016       Allergies:  No Known Allergies      Review of Systems   Review of Systems   Constitutional: Negative for chills and fever. Respiratory: Positive for cough, chest tightness, shortness of breath and wheezing. Gastrointestinal: Negative for abdominal pain, diarrhea, nausea and vomiting. Genitourinary: Negative for vaginal bleeding. All other systems reviewed and are negative. Physical Exam     Vitals:    20   BP: 121/60   Pulse: 89   Resp: 18   Temp: 98.2 °F (36.8 °C)   SpO2: 98%   Weight: 88.1 kg (194 lb 3.2 oz)   Height: 5' 2\" (1.575 m)     Physical Exam  Vitals signs and nursing note reviewed. Constitutional:       General: She is not in acute distress. Appearance: She is well-developed. She is not diaphoretic. HENT:      Head: Normocephalic and atraumatic. Right Ear: External ear normal.      Left Ear: External ear normal.      Mouth/Throat:      Pharynx: No oropharyngeal exudate. Eyes:      General: No scleral icterus. Pupils: Pupils are equal, round, and reactive to light. Comments: Mild pallor   Neck:      Musculoskeletal: Normal range of motion and neck supple. Thyroid: No thyromegaly. Vascular: No JVD. Trachea: No tracheal deviation. Cardiovascular:      Rate and Rhythm: Normal rate and regular rhythm. Heart sounds: Normal heart sounds. Pulmonary:      Effort: Pulmonary effort is normal. No respiratory distress. Breath sounds: No stridor. Wheezing present.       Comments: End expiratory wheeze to the right base and left apex  Abdominal: General: Bowel sounds are normal.      Palpations: Abdomen is soft. Tenderness: There is no guarding or rebound. Comments: Gravid uterus   Musculoskeletal: Normal range of motion. General: No tenderness. Right lower leg: She exhibits no tenderness. No edema. Left lower leg: She exhibits no tenderness. No edema. Comments: No soft tissue injuries   Lymphadenopathy:      Cervical: No cervical adenopathy. Skin:     General: Skin is warm and dry. Findings: No erythema or rash. Neurological:      Mental Status: She is alert and oriented to person, place, and time. Cranial Nerves: No cranial nerve deficit. Coordination: Coordination normal.      Deep Tendon Reflexes: Reflexes are normal and symmetric. Reflexes normal.   Psychiatric:         Behavior: Behavior normal.         Thought Content: Thought content normal.         Judgment: Judgment normal.         Diagnostic Study Results     Labs -     Recent Results (from the past 12 hour(s))   EKG, 12 LEAD, INITIAL    Collection Time: 04/16/20  8:15 PM   Result Value Ref Range    Ventricular Rate 94 BPM    Atrial Rate 94 BPM    P-R Interval 144 ms    QRS Duration 78 ms    Q-T Interval 350 ms    QTC Calculation (Bezet) 437 ms    Calculated P Axis -2 degrees    Calculated R Axis 25 degrees    Calculated T Axis 2 degrees    Diagnosis       Normal sinus rhythm  Nonspecific T wave abnormality  Abnormal ECG  When compared with ECG of 10-LITZY-2017 01:59,  Vent. rate has increased BY  40 BPM  T wave inversion now evident in Anterior leads         Radiologic Studies -   No orders to display         Medical Decision Making   I am the first provider for this patient. I reviewed the vital signs, available nursing notes, past medical history, past surgical history, family history and social history. Vital Signs-Reviewed the patient's vital signs.     Pulse Oximetry Analysis - 98% on RA     EKG interpretation: (Preliminary)  NSR at 94 bpm. Subtle ST segment change in V2 and V3, but unchanged compared EKG on 27 March 2017. EKG read by Michelle Grady. Hair Hernandez MD at 2015     Records Reviewed: Nursing Notes, Old Medical Records, Previous electrocardiograms and Previous Laboratory Studies    Provider Notes (Medical Decision Making): No findings to suggest; CHF, cardiomyopathy, or moderate to severe asthma. PROCEDURES:  Procedures    MEDICATIONS GIVEN IN THE ED:  Medications   albuterol (PROVENTIL HFA, VENTOLIN HFA, PROAIR HFA) inhaler 1 Puff (has no administration in time range)        ED COURSE:   8:22 PM   Initial assessment performed. PROGRESS NOTE:  9:08 PM  Pt and/or family have been updated on their results. Pt and/or pt's family are aware of the plan of care and are in agreement. Written by Odette Rosario, ED Scribe, as dictated by Louis Hernandez MD.      Diagnosis and Disposition       DISCHARGE NOTE:  9:11 PM  The patient is ready for discharge. The patient's signs, symptoms, diagnosis, and discharge instructions have been discussed and the patient and/or family has conveyed their understanding. The patient and/or family is to follow up as recommended or return to the ER should their symptoms worsen. Plan has been discussed and the patient and/or family is in agreement. Written by Odette Rosario, ED Scribe, as dictated by Louis Hernandez MD.     CLINICAL IMPRESSION:  1. Wheezing    2. H/O intrinsic asthma    3. 30 weeks gestation of pregnancy        PLAN:  1. D/C Home  2. There are no discharge medications for this patient. 3.   Follow-up Information     Follow up With Specialties Details Why Contact Info    [facility]  Schedule an appointment as soon as possible for a visit FOR OB FOLLOW UP     [facility] Emergency Medicine  As needed, If symptoms worsen [address] [address]  [phone]        _______________________________    Attestations:   This note is prepared by Yeyo Hall, acting as Scribe for [employer]. Betty Hartmann MD.    [employer]. Betty Hartmann MD:  The scribe's documentation has been prepared under my direction and personally reviewed by me in its entirety.  I confirm that the note above accurately reflects all work, treatment, procedures, and medical decision making performed by me.  _______________________________

## 2020-05-01 ENCOUNTER — PATIENT OUTREACH (OUTPATIENT)
Dept: CASE MANAGEMENT | Age: 30
End: 2020-05-01

## 2020-05-01 NOTE — PROGRESS NOTES
Patient resolved from Transition of Care episode on 5/1/2020   Patient/family has been provided the following resources and education related to COVID-19:                         Signs, symptoms and red flags related to COVID-19            CDC exposure and quarantine guidelines            [facility] exposure contact - [phone]            Contact for their local Department of Health                 Patient currently reports that the following symptoms have improved:  cough, shortness of breath and chest tightness. No further outreach scheduled with this CTN/ACM. Episode of Care resolved. Patient has this CTN/ACM contact information if future needs arise.

## 2022-03-18 PROBLEM — O24.410 DIET CONTROLLED GESTATIONAL DIABETES MELLITUS (GDM) IN THIRD TRIMESTER: Status: ACTIVE | Noted: 2017-03-03

## 2023-02-28 ENCOUNTER — HOSPITAL ENCOUNTER (EMERGENCY)
Facility: HOSPITAL | Age: 33
Discharge: HOME | End: 2023-02-28
Attending: EMERGENCY MEDICINE
Payer: MEDICAID

## 2023-02-28 ENCOUNTER — APPOINTMENT (OUTPATIENT)
Facility: HOSPITAL | Age: 33
End: 2023-02-28
Payer: MEDICAID

## 2023-02-28 VITALS
BODY MASS INDEX: 32.39 KG/M2 | RESPIRATION RATE: 12 BRPM | WEIGHT: 176 LBS | SYSTOLIC BLOOD PRESSURE: 114 MMHG | TEMPERATURE: 96.9 F | DIASTOLIC BLOOD PRESSURE: 75 MMHG | OXYGEN SATURATION: 98 % | HEIGHT: 62 IN | HEART RATE: 83 BPM

## 2023-02-28 DIAGNOSIS — J02.9 ACUTE PHARYNGITIS, UNSPECIFIED ETIOLOGY: Primary | ICD-10-CM

## 2023-02-28 LAB
EKG ATRIAL RATE: 66 BPM
EKG DIAGNOSIS: NORMAL
EKG P AXIS: 38 DEGREES
EKG P-R INTERVAL: 152 MS
EKG Q-T INTERVAL: 392 MS
EKG QRS DURATION: 88 MS
EKG QTC CALCULATION (BAZETT): 410 MS
EKG R AXIS: 50 DEGREES
EKG T AXIS: 43 DEGREES
EKG VENTRICULAR RATE: 66 BPM
FLUAV RNA SPEC QL NAA+PROBE: NOT DETECTED
FLUBV RNA SPEC QL NAA+PROBE: NOT DETECTED
S PYO AG THROAT QL: NEGATIVE
SARS-COV-2 RNA RESP QL NAA+PROBE: NOT DETECTED

## 2023-02-28 PROCEDURE — 93005 ELECTROCARDIOGRAM TRACING: CPT | Performed by: EMERGENCY MEDICINE

## 2023-02-28 PROCEDURE — 71046 X-RAY EXAM CHEST 2 VIEWS: CPT

## 2023-02-28 PROCEDURE — 87636 SARSCOV2 & INF A&B AMP PRB: CPT

## 2023-02-28 PROCEDURE — 99285 EMERGENCY DEPT VISIT HI MDM: CPT

## 2023-02-28 PROCEDURE — 87880 STREP A ASSAY W/OPTIC: CPT

## 2023-02-28 PROCEDURE — 87070 CULTURE OTHR SPECIMN AEROBIC: CPT

## 2023-02-28 ASSESSMENT — PAIN SCALES - GENERAL: PAINLEVEL_OUTOF10: 6

## 2023-02-28 ASSESSMENT — PAIN - FUNCTIONAL ASSESSMENT: PAIN_FUNCTIONAL_ASSESSMENT: 0-10

## 2023-02-28 NOTE — LETTER
[facility] EMERGENCY DEPT  [facility] [address]  Phone: [phone]               February 28, 2023    Patient: Shilpa Rodriguez   YOB: 1990   Date of Visit: 2/28/2023       To Whom It May Concern:    Yuan Mohr was seen and treated in our emergency department on 2/28/2023. She may return to work on 2 March 2023.       Sincerely,       Lex Cheng MD         Signature:__________________________________

## 2023-03-01 NOTE — ED TRIAGE NOTES
Pt CC of: sore throat  Time of onset: morning 2/28  Activity of onset: woke up having symptoms  Description of pain:  Treatment PTA: none  Associated sx: Nausea  Urinary sx:    Pt ambulated independently/ with assistance  Speaking in full sentences clearly  A&Ox 4  Respirations even and unlabored  Pt behavior: Sitting comfortably on chair    Pt states children having strep and wanting to be tested for COVID and strep.  +CP

## 2023-03-01 NOTE — ED PROVIDER NOTES
[facility] EMERGENCY DEPT  EMERGENCY DEPARTMENT ENCOUNTER    Patient Name: Ry Bhatti  MRN: [id number]  YOB: 1990  Provider: Lisa Rodriguez MD  PCP: None Provider   Time/Date of evaluation: 10:13 PM EST on 23    History of Presenting Illness     Chief Complaint   Patient presents with    Pharyngitis       History Provided by: Patient   History is limited by: Nothing    HISTORY Gal Christian):   Ry Bhatti is a 27 y.o. female with a PMHX of asthma  who presents to the emergency department (room 13) by POV C/O sore throat onset today. Associated sxs include cough. Pt denies fevers, chills or any other sxs or complaints. Patient states that she has children who have tested positive for strep. She has had symptoms since this morning. Nursing Notes were all reviewed and agreed with or any disagreements were addressed in the HPI. Past History     PAST MEDICAL HISTORY:  Past Medical History:   Diagnosis Date    Abnormal Papanicolaou smear of cervix          Asthma     Ectopic pregnancy     Eczema     Gestational diabetes     Pregnancy 2006    Rh negative state in antepartum period        PAST SURGICAL HISTORY:  No past surgical history on file. FAMILY HISTORY:  Family History   Problem Relation Age of Onset    Cancer Paternal Grandmother     Cancer Maternal Grandmother     Asthma Father     Other Father     Hypertension Mother     High Cholesterol Mother     Diabetes Maternal Grandfather        SOCIAL HISTORY:  Social History     Tobacco Use    Smoking status: Former     Packs/day: 0.25     Types: Cigarettes     Quit date: 3/18/2015     Years since quittin.9    Smokeless tobacco: Former     Quit date: 2015   Substance Use Topics    Alcohol use: Yes     Alcohol/week: 1.0 standard drink    Drug use: No     Frequency: 2.0 times per week     Types: Marijuana Jailyn Sadia)       MEDICATIONS:  No current facility-administered medications for this encounter.      No current outpatient medications on file. ALLERGIES:  No Known Allergies    SOCIAL DETERMINANTS OF HEALTH:  Social Determinants of Health     Tobacco Use: Not on file   Alcohol Use: Not on file   Financial Resource Strain: Not on file   Food Insecurity: Not on file   Transportation Needs: Not on file   Physical Activity: Not on file   Stress: Not on file   Social Connections: Not on file   Intimate Partner Violence: Not on file   Depression: Not on file   Housing Stability: Not on file       Review of Systems     Negative except as listed above in HPI. Physical Exam     Vitals:    02/28/23 2024   BP: 114/75   Pulse: 83   Resp: 12   Temp: 96.9 °F (36.1 °C)   TempSrc: Tympanic   SpO2: 98%   Weight: 176 lb (79.8 kg)   Height: 5' 2\" (1.575 m)       Physical Exam  Vitals and nursing note reviewed. Constitutional:       General: She is not in acute distress. Appearance: Normal appearance. She is normal weight. She is not ill-appearing. HENT:      Head: Normocephalic and atraumatic. Nose: Nose normal. No rhinorrhea. Mouth/Throat:      Mouth: Mucous membranes are moist. No oral lesions. Pharynx: Oropharynx is clear. Uvula midline. No pharyngeal swelling, oropharyngeal exudate, posterior oropharyngeal erythema or uvula swelling. Tonsils: No tonsillar exudate or tonsillar abscesses. 1+ on the right. 1+ on the left. Eyes:      General:         Right eye: No discharge. Left eye: No discharge. Extraocular Movements: Extraocular movements intact. Conjunctiva/sclera: Conjunctivae normal.      Pupils: Pupils are equal, round, and reactive to light. Cardiovascular:      Rate and Rhythm: Normal rate and regular rhythm. Heart sounds: No murmur heard. No friction rub. No gallop. Pulmonary:      Effort: Pulmonary effort is normal. No respiratory distress. Breath sounds: No wheezing, rhonchi or rales.    Abdominal:      General: Bowel sounds are normal. Palpations: Abdomen is soft. Tenderness: There is no abdominal tenderness. There is no guarding or rebound. Musculoskeletal:         General: No swelling, tenderness or deformity. Normal range of motion. Cervical back: Normal range of motion and neck supple. No rigidity. Lymphadenopathy:      Cervical: No cervical adenopathy. Skin:     General: Skin is warm and dry. Findings: No rash. Neurological:      General: No focal deficit present. Mental Status: She is alert and oriented to person, place, and time. Psychiatric:         Mood and Affect: Mood normal.         Behavior: Behavior normal.       Diagnostic Study Results     LABS:  Recent Results (from the past 12 hour(s))   EKG 12 Lead    Collection Time: 02/28/23  8:29 PM   Result Value Ref Range    Ventricular Rate 66 BPM    Atrial Rate 66 BPM    P-R Interval 152 ms    QRS Duration 88 ms    Q-T Interval 392 ms    QTc Calculation (Bazett) 410 ms    P Axis 38 degrees    R Axis 50 degrees    T Axis 43 degrees    Diagnosis       Poor data quality, interpretation may be adversely affected   COVID-19 & Influenza Combo    Collection Time: 02/28/23  8:31 PM    Specimen: Nasopharyngeal   Result Value Ref Range    SARS-CoV-2, PCR Not detected NOTD      Rapid Influenza A By PCR Not detected NOTD      Rapid Influenza B By PCR Not detected NOTD     POCT rapid strep A    Collection Time: 02/28/23  8:46 PM   Result Value Ref Range    POC Strep A Antigen Negative NEG         RADIOLOGIC STUDIES:   Non x-ray images such as CT, Ultrasound and MRI are read by the radiologist. X-ray images are visualized and preliminarily interpreted by the ED Provider with the findings as listed in the ED Course section below. Interpretation per the Radiologist is listed below, if available at the time of this note:    XR CHEST (2 VW)   Final Result   No acute cardiopulmonary disease.               Procedures     Procedures    ED Course     10:13 PM EST I Velton Casino Carolene Lombard, MD) am the first provider for this patient. Initial assessment performed. I reviewed the vital signs, available nursing notes, past medical history, past surgical history, family history and social history. The patients presenting problems have been discussed, and they are in agreement with the care plan formulated and outlined with them. I have encouraged them to ask questions as they arise throughout their visit. RECORDS REVIEWED: Nursing Notes    Is this patient to be included in the SEP-1 core measure due to severe sepsis or septic shock? No Exclusion criteria - the patient is NOT to be included for SEP-1 Core Measure due to: 2+ SIRS criteria are not met    MEDICATIONS ADMINISTERED IN THE ED:  Medications - No data to display    ED Course as of 02/28/23 2243 Tue Feb 28, 2023 2029 EKG interpretation:  Rhythm: NSR. Rate: 66 bpm; No STEMI  EKG read by Patel Lay MD    []   [address]  Chest X-ray shows no acute cardiopulmonary disease. Interpreted by Patel Lay MD.  Confirmed by Radiologist   []      ED Course User Index  [] Clara Gomez MD       None    Medical Decision Making     SCREENING TOOLS:  None    DDX: Pharyngitis, influenza, COVID, pneumonia    DISCUSSION:  This appears to be a mild contion. This appears to be an acute condition. 27 y.o. female with sore throat since this morning with exposure to strep at home. In evaluation of the above differential diagnosis, consideration was given to the following tests and treatments. Influenza and COVID test were performed and were negative. Rapid strep test was performed and was negative. Chest x-ray was within normal limits. The decision to perform testing and results were discussed with the patient. I discussed each of these tests and considerations with the patient. They agree with the plan of discharge.     ADDITIONAL CONSIDERATIONS:  None    Critical Care Time:     None    Clearance Shoe, MD    Diagnosis and Disposition     DISPOSITION Decision To Discharge 02/28/2023 10:42:15 PM    DISCHARGE NOTE:  Elver Scales's  results have been reviewed with her. She has been counseled regarding her diagnosis, treatment, and plan. She verbally conveys understanding and agreement of the signs, symptoms, diagnosis, treatment and prognosis and additionally agrees to follow up as discussed. She also agrees with the care-plan and conveys that all of her questions have been answered. I have also provided discharge instructions for her that include: educational information regarding their diagnosis and treatment, and list of reasons why they would want to return to the ED prior to their follow-up appointment, should her condition change. She has been provided with education for proper emergency department utilization. CLINICAL IMPRESSION:  1. Acute pharyngitis, unspecified etiology        PLAN:  D/C Home    DISCHARGE MEDICATIONS:  There are no discharge medications for this patient. DISCONTINUED MEDICATIONS:  There are no discharge medications for this patient. PATIENT REFERRED TO:  Follow Up with:  [address] [place] [address],   Phone: [phone]  Schedule an appointment as soon as possible for a visit   As soon as possible, For follow up from the Emergency [facility] [address] [address]  Phone: [phone]  Schedule an appointment as soon as possible for a visit   As soon as possible, For follow up from the Emergency Department    [facility] [address]  [phone]    As needed, If symptoms worsen    I Akira Hackett MD am the primary clinician of record. Kiarra Disclaimer     Please note that this dictation was completed with UMicIt, the computer voice recognition software.   Quite often unanticipated grammatical, syntax, homophones, and other interpretive errors are inadvertently transcribed by the computer software. Please disregard these errors. Please excuse any errors that have escaped final proofreading.     Danish Dixon MD  (Electronically signed)           Kassie Pena MD  02/28/23 1294

## 2023-03-01 NOTE — DISCHARGE INSTRUCTIONS
Return to the ED for worsening symptoms or for other concerns. Over-the-counter Tylenol or Motrin can be used for pain. Drink plenty of water. You can get across the counter Sudafed (pseudoephedrine) from your pharmacist without a prescription.